# Patient Record
Sex: MALE | Race: WHITE | HISPANIC OR LATINO | Employment: OTHER | URBAN - METROPOLITAN AREA
[De-identification: names, ages, dates, MRNs, and addresses within clinical notes are randomized per-mention and may not be internally consistent; named-entity substitution may affect disease eponyms.]

---

## 2017-06-23 DIAGNOSIS — M16.11 UNILATERAL PRIMARY OSTEOARTHRITIS, RIGHT HIP: Primary | ICD-10-CM

## 2017-07-13 ENCOUNTER — CLINICAL SUPPORT (OUTPATIENT)
Dept: REHABILITATION | Facility: HOSPITAL | Age: 67
End: 2017-07-13
Attending: ORTHOPAEDIC SURGERY
Payer: MEDICARE

## 2017-07-13 DIAGNOSIS — M25.551 PAIN OF RIGHT HIP JOINT: ICD-10-CM

## 2017-07-13 DIAGNOSIS — Z74.09 IMPAIRED MOBILITY: ICD-10-CM

## 2017-07-13 DIAGNOSIS — R53.1 DECREASED STRENGTH: ICD-10-CM

## 2017-07-13 DIAGNOSIS — M16.11 OSTEOARTHRITIS OF RIGHT HIP, UNSPECIFIED OSTEOARTHRITIS TYPE: Primary | ICD-10-CM

## 2017-07-13 PROBLEM — M25.659 DECREASED RANGE OF MOTION OF HIP: Status: ACTIVE | Noted: 2017-07-13

## 2017-07-13 PROCEDURE — 97110 THERAPEUTIC EXERCISES: CPT | Mod: PN

## 2017-07-13 PROCEDURE — 97162 PT EVAL MOD COMPLEX 30 MIN: CPT | Mod: PN

## 2017-07-13 PROCEDURE — G8979 MOBILITY GOAL STATUS: HCPCS | Mod: CJ,PN

## 2017-07-13 PROCEDURE — G8978 MOBILITY CURRENT STATUS: HCPCS | Mod: CK,PN

## 2017-07-13 NOTE — PLAN OF CARE
TIME RECORD    Date: 07/13/2017    Start Time:  1405  Stop Time:  1455    PROCEDURES:    TIMED  Procedure Min.   TE 12'             UNTIMED  Procedure Min.   IE 38'         Total Timed Minutes:  12'  Total Timed Units:  1  Total Untimed Units:  1  Charges Billed/# of units:  2 (TE-1, MCE-1    OUTPATIENT PHYSICAL THERAPY   PATIENT EVALUATION  Onset Date: 5-6 months   Primary Diagnosis:   1. Osteoarthritis of right hip, unspecified osteoarthritis type     2. Pain of right hip joint     3. Decreased strength     4. Impaired mobility       Treatment Diagnosis: Hip pain, decreased LE flexibility, decreased hip strength  Past Medical History:   Diagnosis Date    Depression     Thyroid disease      Precautions: no running  Prior Therapy: none   Medications: Yomirufina Christy has a current medication list which includes the following prescription(s): alprazolam, clopidogrel, diazepam, levothyroxine, and pravastatin.  Nutrition: Obese (indicated by FOTO)  History of Present Illness: Chronic R hip pain  Prior Level of Function: Independent  Social History: Retired   Place of Residence (Steps/Adaptations): no barriers  Functional Deficits Leading to Referral/Nature of Injury: history of R hip pain  Patient Therapy Goals: Get rid of limp, relieve pain, get back to running    Subjective     Yomijoelle Christy reports pain in R hip beginning ~6 months ago. Used to run 5 miles a day, but had to stop due to pain ~5 months ago. Pt now walks 45 min a day, which intermittently causes pain. Pt also reports pain at times when he's sitting and lying down. Overall, pain occurs in WB. Intermittently has pain in R knee and ow back pain which pt refers to referred pain from the hip and posture respectively. Pt had X-Ray of R hip taken and reports results indicate decreased cartilage.    Pain:  Location: R hip  Description: Aching, Dull and Sharp  Activities Which Increase Pain: Standing and Walking  Activities Which Decrease Pain: pain  medication (ibproufen)  Pain Scale: 0/10 at best 0/10 now  5/10 at worst    Objective     Posture: Sitting: Rounded shoulders, forward head  Standing: L shoulder elevated (pt reports being L handed)  Palpation: slight TTP to R PSIS and tissue bordering sacrum on R  Pelvis aligned anteriorly and posteriorly  Decreased patellar mobility R and L, no pain  Range of Motion/Strength:     Lumbar  AROM: Pain/Dysfunction with Movement:   Flexion 65 No pain   Extension 25 No pain   Right side bending 15 No pain   Left side bending 13 No pain   Right rotation 90% No pain   Left rotation 100% No pain     Hip  Right   Left  Pain/Dysfunction with Movement    AROM PROM MMT AROM PROM MMT    Flexion NT NT 4+/5 NT NT 4+/5 No pain   Extension NT NT 4+/5 NT NT 4/5 No pain   Abduction NT NT 5/5 NT NT 5/5 No pain      Knee  Right   Left  Pain/Dysfunction with Movement    AROM PROM MMT AROM PROM MMT    Flexion NT NT 5/5 NT NT 5/5 No pain   Extension NT NT 5/5 NT NT 5/5 No pain     DF MMT: 4+/5 R, 5/5 L; no pain    Flexibility: Limited IR B  -35 HS length L 25 R     Gait: Decreased stance time on R (limp observed)    Special Tests: FADER (+) on R, (-) on L  FADIR (-) B   Scour (+) on R, (-) on L  Holt's sign (+) on R for interference of quad contraction (-) for pain, NT on L    Functional Limitation Reports: G codes  Tool: FOTO Hip SURVEY  Category: Mobility ()  Limitation: 42%  Current: CK = at least 40% but < 60% impaired, limited or restricted  Goal: CJ = at least 20% but < 40% impaired, limited or restricted    Treatment: Treatment to consist of manual therapy including STM/MFR right hip, PROM, stretching; therapeutic exercise including LE strengthening/strethcing, postural education, balance and gait training and modalities prn. Gave pt HEP consisting of SLR, clamshells, and HS/piriformis/quad/ITB stretches. Pt verbalized/demo understanding by performing exercises x 12'. Discussed POC with pt and pt verbalized  agreement.    Assessment       Initial Assessment (Pertinent finding, problem list and factors affecting outcome): Pt is a 67 yo female presenting to PT with pain, (+) hip pathology special tests, decreased lumbar ROM, decreased LE flexibility and strength, poor posture, impaired and gait, and functional deficits with walking and running. Impairments consistent with hip joint dysfunction. Pt would benefit from skilled PT to decrease pain and promote return to PLOF.     History  Co-morbidities and personal factors that may impact the plan of care Examination  Body Structures and Functions, activity limitations and participation restrictions that may impact the plan of care Clinical Presentation   Decision Making/ Complexity Score   Co-morbidities:   Arthritis  Obesity (indicated by FOTO)              Personal Factors:     moderate Body Regions: back, LE    Body Systems: musculoskeletal - posture, ROM, strength; neuromuscular - , balance, gait    Activity limitations: WB activity    Participation Restrictions: Running    high     FOTO Hip Survey: 42% limitation    moderate   moderate       Rehab Potiential: good  Barriers to Rehab: none  Short Term Goals (4 Weeks): 8/10/17  1. Pt will be compliant with HEP to supplement PT treatment in improving pain free mobility.  2. Pt will improve B HS length to -20 deg knee extension to improve walking mechanics.  3. Pt will improve R hip extension MMT to 4+/5 to increase hip support during gait performance.  4. Pt will ambulate without limp in to demonstrate improved pain with walking.  Long Term Goals (8 Weeks): 9/7/17  1. Pt will improve FOTO Hip score to </=33% to decrease perceived limitation with functional mobility  2. Pt will improve B HS length to </=15 deg knee extension to improve walking mechanics.  3. Pt will report pain </=2/10 at worst with walking to improve functional QOL.  4. Pt will improve lumbar side flexion to 25 deg B to decrease mechanical stresses placed  on hip with mobility.    Plan     Certification Period: 7/13/17 to 9/7/17  Recommended Treatment Plan: 2 times per week for 8 weeks (per request of pt due to co-pays): Gait Training, Manual Therapy, Moist Heat/ Ice, Neuromuscular Re-ed, Patient Education, Therapeutic Activites and Therapeutic Exercise  Other Recommendations: modalities prn, ASTYM prn, kinesiotape prn, Functional Dry Needling prn       Therapist: Constance Al, PT    I CERTIFY THE NEED FOR THESE SERVICES FURNISHED UNDER THIS PLAN OF TREATMENT AND WHILE UNDER MY CARE    Physician's comments: ________________________________________________________________________________________________________________________________________________      Physician's Name: ___________________________________

## 2017-07-20 ENCOUNTER — CLINICAL SUPPORT (OUTPATIENT)
Dept: REHABILITATION | Facility: HOSPITAL | Age: 67
End: 2017-07-20
Attending: ORTHOPAEDIC SURGERY
Payer: MEDICARE

## 2017-07-20 DIAGNOSIS — M25.551 PAIN OF RIGHT HIP JOINT: ICD-10-CM

## 2017-07-20 DIAGNOSIS — Z74.09 IMPAIRED MOBILITY: ICD-10-CM

## 2017-07-20 DIAGNOSIS — M16.11 OSTEOARTHRITIS OF RIGHT HIP, UNSPECIFIED OSTEOARTHRITIS TYPE: ICD-10-CM

## 2017-07-20 DIAGNOSIS — R53.1 DECREASED STRENGTH: ICD-10-CM

## 2017-07-20 DIAGNOSIS — M25.659 DECREASED RANGE OF MOTION OF HIP: ICD-10-CM

## 2017-07-20 PROCEDURE — 97110 THERAPEUTIC EXERCISES: CPT | Mod: PN

## 2017-07-20 NOTE — PROGRESS NOTES
"TIME RECORD    Date:  07/20/2017    Start Time:  200  Stop Time:  255    PROCEDURES:    TIMED  Procedure Min.   Therex 45     UNTIMED  Procedure Min.   Bike 5         Total Timed Minutes:  45  Total Timed Units:  3  Total Untimed Units:  0  Charges Billed/# of units:  3 TE      Progress/Current Status    Subjective:     Patient ID: Patrick Christy is a 66 y.o. male.  Diagnosis:   1. Pain of right hip joint     2. Decreased range of motion of hip     3. Decreased strength     4. Impaired mobility     5. Osteoarthritis of right hip, unspecified osteoarthritis type       Pain: 3 /10  Patient states "not much pain right now - but if I'm on it too long it can be rough."    Objective:     Patient initiated session on Sci Fit bike x 5 minutes.  Bike initially set at L1, patient increased to L5.  Instructed in and performed all therex as per log.  Reviewed all self stretching as performed for best technique.      Date  7/20/17   VISIT 2   Gcode 2/10   FOTO 2/5   Cap Visit   Cap Total  95.94  203.52   MT --   Long Sit HS stairs   .    Bike Sci Fit  L5 x 5 min   SLR 2x10 B   SLR 2x10 B   SL Abd Clams 2x12 B   Clams 2x12 B   Socorro Hip Add 5"x10   Bridges 2x10   LAQs --   Seated Hip Flex --   Cybex   Leg Press --   TKE --   Hip Abd --   Hip Flex --   Hip Ext --   HS Curls --   Knee Ext --   Step Ups --   Step Downs --   Gait --   CP --   Initials DH 1/6         Assessment:     Patient required re-instruction of self stretching for best/correct technique.  Able to perform same and all therex as logged without complaint of pain or difficulty.  "Actually its better now" after session.  Not specific to scale.    Patient Education/Response:     Reinstructed in self stretching for best technique.    Plans and Goals:     Continue PT per POC, progress as able.    Short Term Goals (4 Weeks): 8/10/17  1. Pt will be compliant with HEP to supplement PT treatment in improving pain free mobility.  2. Pt will improve B HS length to -20 deg knee " extension to improve walking mechanics.  3. Pt will improve R hip extension MMT to 4+/5 to increase hip support during gait performance.  4. Pt will ambulate without limp in to demonstrate improved pain with walking.  Long Term Goals (8 Weeks): 9/7/17  1. Pt will improve FOTO Hip score to </=33% to decrease perceived limitation with functional mobility  2. Pt will improve B HS length to </=15 deg knee extension to improve walking mechanics.  3. Pt will report pain </=2/10 at worst with walking to improve functional QOL.  4. Pt will improve lumbar side flexion to 25 deg B to decrease mechanical stresses placed on hip with mobility

## 2017-07-26 ENCOUNTER — CLINICAL SUPPORT (OUTPATIENT)
Dept: REHABILITATION | Facility: HOSPITAL | Age: 67
End: 2017-07-26
Attending: ORTHOPAEDIC SURGERY
Payer: MEDICARE

## 2017-07-26 DIAGNOSIS — M16.11 OSTEOARTHRITIS OF RIGHT HIP, UNSPECIFIED OSTEOARTHRITIS TYPE: ICD-10-CM

## 2017-07-26 DIAGNOSIS — M25.551 PAIN OF RIGHT HIP JOINT: ICD-10-CM

## 2017-07-26 DIAGNOSIS — M25.659 DECREASED RANGE OF MOTION OF HIP: ICD-10-CM

## 2017-07-26 DIAGNOSIS — R53.1 DECREASED STRENGTH: ICD-10-CM

## 2017-07-26 DIAGNOSIS — Z74.09 IMPAIRED MOBILITY: ICD-10-CM

## 2017-07-26 PROCEDURE — 97140 MANUAL THERAPY 1/> REGIONS: CPT | Mod: PN

## 2017-07-26 PROCEDURE — 97110 THERAPEUTIC EXERCISES: CPT | Mod: PN

## 2017-07-26 NOTE — PROGRESS NOTES
"TIME RECORD    Date:  07/26/2017    Start Time:  200  Stop Time:  255    PROCEDURES:    TIMED  Procedure Min.   Therex 45     UNTIMED  Procedure Min.   Bike 5         Total Timed Minutes:  45  Total Timed Units:  3  Total Untimed Units:  0  Charges Billed/# of units:  3 TE      Progress/Current Status    Subjective:     Patient ID: Patrick Christy is a 66 y.o. male.  Diagnosis:   1. Pain of right hip joint     2. Decreased range of motion of hip     3. Decreased strength     4. Impaired mobility     5. Osteoarthritis of right hip, unspecified osteoarthritis type       Pain: 3 /10  Patient states "not much pain right now - but if I'm on it too long it can be rough."    Objective:     Patient initiated session on Sci Fit bike x 5 minutes.  Bike initially set at L1, patient increased to L5.  Instructed in and performed all therex as per log.  Reviewed all self stretching as performed for best technique.      Date  7/20/17   VISIT 2   Gcode 2/10   FOTO 2/5   Cap Visit   Cap Total  95.94  203.52   MT --   Long Sit HS stairs   .    Bike Sci Fit  L5 x 5 min   SLR 2x10 B   SLR 2x10 B   SL Abd Clams 2x12 B   Clams 2x12 B   Socorro Hip Add 5"x10   Bridges 2x10   LAQs --   Seated Hip Flex --   Cybex   Leg Press --   TKE --   Hip Abd --   Hip Flex --   Hip Ext --   HS Curls --   Knee Ext --   Step Ups --   Step Downs --   Gait --   CP --   Initials DH 1/6         Assessment:     Patient required re-instruction of self stretching for best/correct technique.  Able to perform same and all therex as logged without complaint of pain or difficulty.  "Actually its better now" after session.  Not specific to scale.    Patient Education/Response:     Reinstructed in self stretching for best technique.    Plans and Goals:     Continue PT per POC, progress as able.    Short Term Goals (4 Weeks): 8/10/17  1. Pt will be compliant with HEP to supplement PT treatment in improving pain free mobility.  2. Pt will improve B HS length to -20 deg knee " extension to improve walking mechanics.  3. Pt will improve R hip extension MMT to 4+/5 to increase hip support during gait performance.  4. Pt will ambulate without limp in to demonstrate improved pain with walking.  Long Term Goals (8 Weeks): 9/7/17  1. Pt will improve FOTO Hip score to </=33% to decrease perceived limitation with functional mobility  2. Pt will improve B HS length to </=15 deg knee extension to improve walking mechanics.  3. Pt will report pain </=2/10 at worst with walking to improve functional QOL.  4. Pt will improve lumbar side flexion to 25 deg B to decrease mechanical stresses placed on hip with mobility

## 2017-07-26 NOTE — PROGRESS NOTES
"TIME RECORD    Date:  07/26/2017    Start Time:  1305  Stop Time:  1358    PROCEDURES:    TIMED  Procedure Min.   MT 15'   TE 38'     UNTIMED  Procedure Min.             Total Timed Minutes: 53'  Total Timed Units:  4  Total Untimed Units:  0  Charges Billed/# of units:  4 (TE-3, MT-1)      Progress/Current Status    Subjective:     Patient ID: Patrick Christy is a 66 y.o. male.  Diagnosis:   1. Pain of right hip joint     2. Decreased range of motion of hip     3. Decreased strength     4. Impaired mobility     5. Osteoarthritis of right hip, unspecified osteoarthritis type       Pain: 4 /10  Pt reports pain is about the same since starting therapy. Still able to walk and do mod exercise, but it continues to cause pain.     Objective:     MT x 15' including STM/MFR  to ITB, VL, piriformis/glutes; long axis sustained and oscillatory traction. TE 1:1 with PT per log x 38'. Added SL abduction and piriformis/ ITB/hip flexor stretches to improve hip flexibility and strength.    Date  7/26/17 7/20/17   VISIT 3 2   Gcode 3/10 2/10   FOTO 3/5 2/5   Cap Visit   Cap Total 125.80  329.32  95.94  203.52   MT 15' --   Stretches Supine:  Piriformis  HS c/ strap  ITB c/ strap  Hip flexor  3x30" all R HS-stairs  3x30"   Bike -- Sci Fit  L5 x 5 min   SLR 2x12 B 2x10 B   SL Abd 2x10 B Clams 2x12 B   Clams 2x10 B  2x12 B   Socorro Hip Add 5"x15 5"x10   Bridges 3"x2x12 2x10   LAQs  --   Seated Hip Flex  --   Cybex   Leg Press  --   TKE  --   Hip Abd  --   Hip Flex  --   Hip Ext  --   HS Curls  --   Knee Ext  --   Step Ups  --   Step Downs  --   Wall squats Mini squats against theraball 2x10    Gait  --   CP  --   Initials CC  1/6       Assessment:     No  Rhip pain at end of session. Pt tolerated new stretches and progression of exercises and addition of SL well, using proper form with verbal cues.    Patient Education/Response:     PT suggested buying tennis ball to put under glutes/piriformis to relieve pain. Perform stretches on both " LE for HEP.    Plans and Goals:     Continue PT per POC, progress as able.    Short Term Goals (4 Weeks): 8/10/17  1. Pt will be compliant with HEP to supplement PT treatment in improving pain free mobility.  2. Pt will improve B HS length to -20 deg knee extension to improve walking mechanics.  3. Pt will improve R hip extension MMT to 4+/5 to increase hip support during gait performance.  4. Pt will ambulate without limp in to demonstrate improved pain with walking.  Long Term Goals (8 Weeks): 9/7/17  1. Pt will improve FOTO Hip score to </=33% to decrease perceived limitation with functional mobility  2. Pt will improve B HS length to </=15 deg knee extension to improve walking mechanics.  3. Pt will report pain </=2/10 at worst with walking to improve functional QOL.  4. Pt will improve lumbar side flexion to 25 deg B to decrease mechanical stresses placed on hip with mobility

## 2017-08-01 ENCOUNTER — CLINICAL SUPPORT (OUTPATIENT)
Dept: REHABILITATION | Facility: HOSPITAL | Age: 67
End: 2017-08-01
Attending: ORTHOPAEDIC SURGERY
Payer: MEDICARE

## 2017-08-01 DIAGNOSIS — R53.1 DECREASED STRENGTH: ICD-10-CM

## 2017-08-01 DIAGNOSIS — M25.551 PAIN OF RIGHT HIP JOINT: ICD-10-CM

## 2017-08-01 DIAGNOSIS — Z74.09 IMPAIRED MOBILITY: ICD-10-CM

## 2017-08-01 DIAGNOSIS — M16.11 OSTEOARTHRITIS OF RIGHT HIP, UNSPECIFIED OSTEOARTHRITIS TYPE: ICD-10-CM

## 2017-08-01 DIAGNOSIS — M25.659 DECREASED RANGE OF MOTION OF HIP: ICD-10-CM

## 2017-08-01 PROCEDURE — 97110 THERAPEUTIC EXERCISES: CPT | Mod: PN

## 2017-08-01 PROCEDURE — 97140 MANUAL THERAPY 1/> REGIONS: CPT | Mod: PN

## 2017-08-01 NOTE — PROGRESS NOTES
"TIME RECORD    Date:  08/01/2017    Start Time:  4:00  Stop Time:   4:55    PROCEDURES:    TIMED  Procedure Min.   MT 15'   TE supervised  20'   TE 20'     UNTIMED  Procedure Min.             Total Timed Minutes: 55'  Total Timed Units:  2  Total Untimed Units:  0  Charges Billed/# of units:  2 (TE-1, MT-1)      Progress/Current Status    Subjective:     Patient ID: Patrick Christy is a 67 y.o. male.  Diagnosis:   1. Pain of right hip joint     2. Decreased range of motion of hip     3. Decreased strength     4. Impaired mobility     5. Osteoarthritis of right hip, unspecified osteoarthritis type       Pain: 2 /10; 0/10 following interventions  Pt reports incidence, intensity and duration of pain diminished.    Objective:   Treatment initiated with recumbent bike 10' followed bt supervised TE 15' then 1:1 with 20 minutes  With  PTA TE per log.  MT x 15' including STM/MFR  to ITB, VL, piriformis/glutes; long axis sustained and oscillatory traction.  flexor stretches to improve hip flexibility and strength.    Date  8/01/17 7/26/17 7/20/17   VISIT  3 2   Gcode 4/10 3/10 2/10   FOTO 4/5 3/5 2/5   Cap Visit   Cap Total 61.84  391. 16 125.80  329.32  95.94  203.52   MT 15' 15' --   Stretches Supine  Piriformis  HSS w/strap  Hip flex  3 x 30" all Supine:  Piriformis  HS c/ strap  ITB c/ strap  Hip flexor  3x30" all R HS-stairs  3x30"   Bike 5' -- Sci Fit  L5 x 5 min   SLR 2 x 12 B 2x12 B 2x10 B   SL Abd 2 x 10 B 2x10 B Clams 2x12 B   Clams 2 x 10 B GTB 2x10 B  2x12 B   Socorro Hip Add 5" x 15 5"x15 5"x10   Bridges 3" x 20 3"x2x12 2x10   LAQs   --   Seated Hip Flex   --   Cybex   Leg Press   --   TKE   --   Hip Abd   --   Hip Flex   --   Hip Ext   --   HS Curls   --   Knee Ext   --   Step Ups   --   Step Downs   --   Wall squats oot Mini squats against theraball 2x10    Gait   --   CP   --   Initials MB 1/6 CC DH 1/6       Assessment:     Initial Right hip pain abolished following interventions.     Patient Education/Response: "     PT suggested buying tennis ball to put under glutes/piriformis to relieve pain. Perform stretches on both LE for HEP.    Plans and Goals:     Continue PT per POC, progress as able.    Short Term Goals (4 Weeks): 8/10/17  1. Pt will be compliant with HEP to supplement PT treatment in improving pain free mobility.  2. Pt will improve B HS length to -20 deg knee extension to improve walking mechanics.  3. Pt will improve R hip extension MMT to 4+/5 to increase hip support during gait performance.  4. Pt will ambulate without limp in to demonstrate improved pain with walking.  Long Term Goals (8 Weeks): 9/7/17  1. Pt will improve FOTO Hip score to </=33% to decrease perceived limitation with functional mobility  2. Pt will improve B HS length to </=15 deg knee extension to improve walking mechanics.  3. Pt will report pain </=2/10 at worst with walking to improve functional QOL.  4. Pt will improve lumbar side flexion to 25 deg B to decrease mechanical stresses placed on hip with mobility

## 2017-08-03 ENCOUNTER — CLINICAL SUPPORT (OUTPATIENT)
Dept: REHABILITATION | Facility: HOSPITAL | Age: 67
End: 2017-08-03
Attending: ORTHOPAEDIC SURGERY
Payer: MEDICARE

## 2017-08-03 DIAGNOSIS — M16.11 OSTEOARTHRITIS OF RIGHT HIP, UNSPECIFIED OSTEOARTHRITIS TYPE: ICD-10-CM

## 2017-08-03 DIAGNOSIS — R53.1 DECREASED STRENGTH: ICD-10-CM

## 2017-08-03 DIAGNOSIS — M25.659 DECREASED RANGE OF MOTION OF HIP: ICD-10-CM

## 2017-08-03 DIAGNOSIS — M25.551 PAIN OF RIGHT HIP JOINT: ICD-10-CM

## 2017-08-03 DIAGNOSIS — Z74.09 IMPAIRED MOBILITY: ICD-10-CM

## 2017-08-03 PROCEDURE — 97110 THERAPEUTIC EXERCISES: CPT | Mod: PN

## 2017-08-03 NOTE — PROGRESS NOTES
"TIME RECORD    Date:  08/03/2017    Start Time:  2:00  Stop Time:   2:55       PROCEDURES:    TIMED  Procedure Min.   MT NP   TE supervised NP   TE 45     UNTIMED  Procedure Min.   Bike  10'         Total Timed Minutes: 55'  Total Timed Units:  2  Total Untimed Units:  0  Charges Billed/# of units:  3 (TE-3,  )      Progress/Current Status    Subjective:     Patient ID: Patrick Christy is a 67 y.o. male.  Diagnosis:   No diagnosis found.  Pain: 1 /10; 0/10 following interventions  Pt reports incidence, intensity and duration of pain diminished.    Objective:   Treatment initiated with recumbent bike 10' followed bt supervised TE 15' then 1:1 with 20 minutes  With  PTA TE per log.  MT x 5' including Manual IT maurisio stretch w/hip anchor (Not performed due to pain free STM/MFR  to ITB, VL, piriformis/glutes; long axis sustained and oscillatory traction.)  flexor stretches to improve hip flexibility and strength.    Date  08/03/17 8/01/17 7/26/17 7/20/17   VISIT   3 2   Gcode 5/10 4/10 3/10 2/10   FOTO DONE 4/5 3/5 2/5   Cap Visit   Cap Total  61.84  391. 16 125.80  329.32  95.94  203.52   MT  15' 15' --   Stretches Supine  Piriformis  HS  Stretches strap.Thomasposition quad  3x30" B all Supine  Piriformis  HSS w/strap  Hip flex  3 x 30" all Supine:  Piriformis  HS c/ strap  ITB c/ strap  Hip flexor  3x30" all R HS-stairs  3x30"   Bike 10' 5' -- Sci Fit  L5 x 5 min   SLR 2x 12 B 2 x 12 B 2x12 B 2x10 B   SL Abd 2 x 12 B 2 x 10 B 2x10 B Clams 2x12 B   Clams supine 2 x 15 GTB 2 x 10 B GTB 2x10 B  2x12 B   Socorro Hip Add 5" x15 w/ball 5" x 15 5"x15 5"x10   Bridges 3" x 20 3" x 20 3"x2x12 2x10   LAQs    --   Seated Hip Flex    --   Cybex   Leg Press    --   TKE    --   Hip Abd    --   Hip Flex    --   Hip Ext    --   HS Curls    --   Knee Ext    --   Step Ups    --   Step Downs    --   Wall squats  oot Mini squats against theraball 2x10    Gait    --   CP    --   Initials MB 2/6 MB 1/6 CC DH 1/6       Assessment:     Presents " with Initial Right hip pain One point  Less than last. Good HEP compliance.    Patient Education/Response:     PT suggested buying tennis ball to put under glutes/piriformis to relieve pain. Perform stretches on both LE for HEP.    Plans and Goals:     Continue PT per POC, progress as able.    Short Term Goals (4 Weeks): 8/10/17  1. Pt will be compliant with HEP to supplement PT treatment in improving pain free mobility.  2. Pt will improve B HS length to -20 deg knee extension to improve walking mechanics.  3. Pt will improve R hip extension MMT to 4+/5 to increase hip support during gait performance.  4. Pt will ambulate without limp in to demonstrate improved pain with walking.  Long Term Goals (8 Weeks): 9/7/17  1. Pt will improve FOTO Hip score to </=33% to decrease perceived limitation with functional mobility  2. Pt will improve B HS length to </=15 deg knee extension to improve walking mechanics.  3. Pt will report pain </=2/10 at worst with walking to improve functional QOL.  4. Pt will improve lumbar side flexion to 25 deg B to decrease mechanical stresses placed on hip with mobility

## 2017-08-08 ENCOUNTER — CLINICAL SUPPORT (OUTPATIENT)
Dept: REHABILITATION | Facility: HOSPITAL | Age: 67
End: 2017-08-08
Attending: ORTHOPAEDIC SURGERY
Payer: MEDICARE

## 2017-08-08 DIAGNOSIS — M25.659 DECREASED RANGE OF MOTION OF HIP: ICD-10-CM

## 2017-08-08 DIAGNOSIS — M25.551 PAIN OF RIGHT HIP JOINT: ICD-10-CM

## 2017-08-08 DIAGNOSIS — M16.11 OSTEOARTHRITIS OF RIGHT HIP, UNSPECIFIED OSTEOARTHRITIS TYPE: ICD-10-CM

## 2017-08-08 DIAGNOSIS — Z74.09 IMPAIRED MOBILITY: ICD-10-CM

## 2017-08-08 DIAGNOSIS — R53.1 DECREASED STRENGTH: ICD-10-CM

## 2017-08-08 PROCEDURE — 97110 THERAPEUTIC EXERCISES: CPT | Mod: PN

## 2017-08-08 NOTE — PROGRESS NOTES
"TIME RECORD    Date:  08/08/2017    Start Time:  1:55  Stop Time:   2:55       PROCEDURES:    TIMED  Procedure Min.   MT NP   TE supervised NP   TE 50     UNTIMED  Procedure Min.   Bike  10'         Total Timed Minutes: 60'  Total Timed Units:  4  Total Untimed Units:  0  Charges Billed/# of units:  4 (TE-4)      Progress/Current Status    Subjective:     Patient ID: Patrick Christy is a 67 y.o. male.  Diagnosis:   1. Pain of right hip joint     2. Decreased range of motion of hip     3. Decreased strength     4. Impaired mobility     5. Osteoarthritis of right hip, unspecified osteoarthritis type       Pain: 0 /10; 0/10 following interventions  Pt reports incidence, intensity and duration of pain diminished. "I'm a little better than last week." Patient visited a Naturopathic medical practitioner who prescribed natural remedies including Glucosamine     Objective:   Treatment initiated with Sci fit B U&LE reciprocal integration L 5 for  10'  > 60 spm w/o rest followed by 1:1 with 45 minutes  With  PTA TE per log.      Date  8/8/17 08/03/17 8/01/17 7/26/17 7/20/17   VISIT    3 2   Gcode 6/10 5/10 4/10 3/10 2/10   FOTO  DONE 4/5 3/5 2/5   Cap Visit   Cap Total 127.92  615.02 95.94  487.10 61.84  391. 16 125.80  329.32  95.94  203.52   MT -- -- 15' 15' --   Stretches Supine  Piriformis  HS  Sudarshan quad  3 x 30" all Supine  Piriformis  HS  Stretches strap.Thomasposition quad  3x30" B all Supine  Piriformis  HSS w/strap  Hip flex  3 x 30" all Supine:  Piriformis  HS c/ strap  ITB c/ strap  Hip flexor  3x30" all R HS-stairs  3x30"   Bike 10' L5 10' 5' -- Sci Fit  L5 x 5 min   SLR  2x 12 B 2 x 12 B 2x12 B 2x10 B   SL Abd  2 x 12 B 2 x 10 B 2x10 B Clams 2x12 B   Clams supine 2x15 GTB 2 x 15 GTB 2 x 10 B GTB 2x10 B  2x12 B   Socorro Hip Add 5" x 15 w/ball 5" x15 w/ball 5" x 15 5"x15 5"x10   Bridges  3" x 20 3" x 20 3"x2x12 2x10   LAQs     --   Seated Hip Flex     --   Cybex   Leg Press 2 x 15 65#    --   TKE     --   Hip Abd 2 " x 10 20#    --   Hip Flex     --   Hip Ext 2 x 10 20#    --   HS Curls     --   Knee Ext     --   Step Ups 2 x 10    --   Step Downs 2 x 10    --   Step bkwd.fwd  BTB resistance 2 x 10       Side step with BTB resistance  3 laps       Wall squats   oot Mini squats against theraball 2x10    Gait     --   CP     --   Initials MB 3/6 MB 2/6 MB 1/6 CC DH 1/6       Assessment:     Presents pain free. Tolerated progression of treatment without pain.  Good HEP compliance.    Patient Education/Response:     PT suggested buying tennis ball to put under glutes/piriformis to relieve pain. Perform stretches on both LE for HEP.    Plans and Goals:     Continue PT per POC, progress as able.    Short Term Goals (4 Weeks): 8/10/17  1. Pt will be compliant with HEP to supplement PT treatment in improving pain free mobility.  2. Pt will improve B HS length to -20 deg knee extension to improve walking mechanics.  3. Pt will improve R hip extension MMT to 4+/5 to increase hip support during gait performance.  4. Pt will ambulate without limp in to demonstrate improved pain with walking.  Long Term Goals (8 Weeks): 9/7/17  1. Pt will improve FOTO Hip score to </=33% to decrease perceived limitation with functional mobility  2. Pt will improve B HS length to </=15 deg knee extension to improve walking mechanics.  3. Pt will report pain </=2/10 at worst with walking to improve functional QOL.  4. Pt will improve lumbar side flexion to 25 deg B to decrease mechanical stresses placed on hip with mobility

## 2017-08-10 ENCOUNTER — PATIENT MESSAGE (OUTPATIENT)
Dept: REHABILITATION | Facility: HOSPITAL | Age: 67
End: 2017-08-10

## 2017-08-15 ENCOUNTER — CLINICAL SUPPORT (OUTPATIENT)
Dept: REHABILITATION | Facility: HOSPITAL | Age: 67
End: 2017-08-15
Attending: ORTHOPAEDIC SURGERY
Payer: MEDICARE

## 2017-08-15 DIAGNOSIS — R53.1 DECREASED STRENGTH: ICD-10-CM

## 2017-08-15 DIAGNOSIS — M25.659 DECREASED RANGE OF MOTION OF HIP: ICD-10-CM

## 2017-08-15 DIAGNOSIS — M25.551 PAIN OF RIGHT HIP JOINT: ICD-10-CM

## 2017-08-15 DIAGNOSIS — Z74.09 IMPAIRED MOBILITY: ICD-10-CM

## 2017-08-15 DIAGNOSIS — M16.11 OSTEOARTHRITIS OF RIGHT HIP, UNSPECIFIED OSTEOARTHRITIS TYPE: ICD-10-CM

## 2017-08-15 PROCEDURE — 97140 MANUAL THERAPY 1/> REGIONS: CPT | Mod: PN

## 2017-08-15 PROCEDURE — 97110 THERAPEUTIC EXERCISES: CPT | Mod: PN

## 2017-08-15 NOTE — PROGRESS NOTES
"TIME RECORD    Date:  08/15/2017    Start Time:  1505  Stop Time:   1600      PROCEDURES:    TIMED  Procedure Min.   MT 17'   TE 38'         UNTIMED  Procedure Min.             Total Timed Minutes: 55'  Total Timed Units:  4  Total Untimed Units:  0  Charges Billed/# of units:  4 (MT-1, TE-3)      Progress/Current Status    Subjective:     Patient ID: Patrick Christy is a 67 y.o. male.  Diagnosis:   1. Pain of right hip joint     2. Decreased range of motion of hip     3. Decreased strength     4. Impaired mobility     5. Osteoarthritis of right hip, unspecified osteoarthritis type       Pain: 5/10 in R hip pre PT, 1/10 post PT    Pt reports increased R pain since last Thursday. Pt reports he feels pain every where, even upper back. Pain in upper back began Tuesday night and pt attributes upper back pain to stretches with strap. Pt reports he's been doing HEP list 3x15, reporting HEP takes over an hour to do HEP each time. Pt reports his wife tells him he has a tendency of overdoing things. Pt states addition of lifting weighted ball and performing circles with B LE on ball last visit, but exercises not in log.     Objective:     MT x 17' including STM/MFR to R ITB, VL, HS, and piriformis. TE 1:1 with PT x 38' per log. Modified HS stretch and quad stretch to standing. HELD SL abduction.    Date  8/15/17 8/8/17 08/03/17 8/01/17 7/26/17 7/20/17   VISIT 7 6 5 4 3 2   Gcode 7/10 6/10 5/10 4/10 3/10 2/10   FOTO   DONE 4/5 3/5 2/5   Cap Visit   Cap Total 125.80  740.82 127.92  615.02 95.94  487.10 61.84  391. 16 125.80  329.32  95.94  203.52   MT 17' -- -- 15' 15' --   Stretches Standing  HS 2x1'  Quad 2x 1'  Supine  Piriformis 3x30" Supine  Piriformis  HS  Sudarshan quad  3 x 30" all Supine  Piriformis  HS  Stretches strap.Thomasposition quad  3x30" B all Supine  Piriformis  HSS w/strap  Hip flex  3 x 30" all Supine:  Piriformis  HS c/ strap  ITB c/ strap  Hip flexor  3x30" all R HS-stairs  3x30"   Bike -- 10' L5 10' 5' -- " "Sci Fit  L5 x 5 min   SLR 2x10 B  2x 12 B 2 x 12 B 2x12 B 2x10 B   SL Abd HELD  2 x 12 B 2 x 10 B 2x10 B Clams 2x12 B   Clams supine 2x15 GTB  ^ next 2x15 GTB 2 x 15 GTB 2 x 10 B GTB 2x10 B  2x12 B   Socorro Hip Add 5" x 15 w/ball 5" x 15 w/ball 5" x15 w/ball 5" x 15 5"x15 5"x10   Bridges   3" x 20 3" x 20 3"x2x12 2x10   LAQs      --   Seated Hip Flex      --   Cybex   Leg Press 6.5 pl  2x15 DL 2 x 15 65#    --   TKE      --   Hip Abd 2 pl 2x10 B 2 x 10 20#    --   Hip Flex      --   Hip Ext 2 pl 2x10 B 2 x 10 20#    --   HS Curls      --   Knee Ext      --   Step Ups oot 2 x 10    --   Step Downs oot 2 x 10    --   Step bkwd.fwd  BTB resistance oot 2 x 10       Side step with BTB resistance  oot 3 laps       Wall squats    oot Mini squats against theraball 2x10    Gait      --   CP      --   Initials CC MB 3/6 MB 2/6 MB 1/6 CC DH 1/6       Assessment:     Improvement in pain post MT and TE. Held SL abduction per pt report that completion of them during HEP increases R hip pain. Held progression of TE due to increased pain initially.     Patient Education/Response:     Emailed pt updated HEP with standing quad and HS stretch, supine piriformis stretch, SLR, hip adduction isometric, and clamshells with GTB. Pt to perform stretches 3x30" and exercises no more than 2x15 reps. Pt verbalized understanding.    Plans and Goals:     Continue PT per POC, progress as able.    Short Term Goals (4 Weeks): 8/10/17  1. Pt will be compliant with HEP to supplement PT treatment in improving pain free mobility.  2. Pt will improve B HS length to -20 deg knee extension to improve walking mechanics.  3. Pt will improve R hip extension MMT to 4+/5 to increase hip support during gait performance.  4. Pt will ambulate without limp in to demonstrate improved pain with walking.  Long Term Goals (8 Weeks): 9/7/17  1. Pt will improve FOTO Hip score to </=33% to decrease perceived limitation with functional mobility  2. Pt will improve B HS " length to </=15 deg knee extension to improve walking mechanics.  3. Pt will report pain </=2/10 at worst with walking to improve functional QOL.  4. Pt will improve lumbar side flexion to 25 deg B to decrease mechanical stresses placed on hip with mobility

## 2017-08-16 ENCOUNTER — DOCUMENTATION ONLY (OUTPATIENT)
Dept: REHABILITATION | Facility: HOSPITAL | Age: 67
End: 2017-08-16

## 2017-08-16 NOTE — PROGRESS NOTES
Face to Face PTA Conference performed with  Filipe Louise PTA, Darin Yin PTA, Yamila Beauchamp, PTA and Barbara Eli PTA regarding patient's current status, overall progress, and plan of care    Face to face PT Conference performed with , Constance Al PT  regarding patient progress, current status, and plan of care. .     Filipe Louise, PTA    Face to face conference completed with Constance Al, PT on 8/15/17 regarding current status and overall progress of Yomi. Westley.  Barbara Eli PTA    Face to face conference completed with Constance Al PT on 8/15/17 regarding current status and overall progress of Yomi. Christy.  Neena Beauchamp, PTA      Face to face conference completed with Constance Al PT on 8/15/17 regarding current status and overall progress of Yomi. Christy.   Darin Yin, PTA

## 2017-08-17 ENCOUNTER — CLINICAL SUPPORT (OUTPATIENT)
Dept: REHABILITATION | Facility: HOSPITAL | Age: 67
End: 2017-08-17
Attending: ORTHOPAEDIC SURGERY
Payer: MEDICARE

## 2017-08-17 DIAGNOSIS — M25.551 PAIN OF RIGHT HIP JOINT: ICD-10-CM

## 2017-08-17 DIAGNOSIS — M25.659 DECREASED RANGE OF MOTION OF HIP: ICD-10-CM

## 2017-08-17 DIAGNOSIS — M16.11 OSTEOARTHRITIS OF RIGHT HIP, UNSPECIFIED OSTEOARTHRITIS TYPE: ICD-10-CM

## 2017-08-17 DIAGNOSIS — R53.1 DECREASED STRENGTH: ICD-10-CM

## 2017-08-17 DIAGNOSIS — Z74.09 IMPAIRED MOBILITY: ICD-10-CM

## 2017-08-17 PROCEDURE — 97110 THERAPEUTIC EXERCISES: CPT | Mod: PN

## 2017-08-17 PROCEDURE — 97140 MANUAL THERAPY 1/> REGIONS: CPT | Mod: PN

## 2017-08-17 NOTE — PROGRESS NOTES
"TIME RECORD    Date:  08/17/2017    Start Time:  1500  Stop Time:   1600      PROCEDURES:    TIMED  Procedure Min.   MT 20'   TE 40'         UNTIMED  Procedure Min.             Total Timed Minutes: 60'  Total Timed Units:  4  Total Untimed Units:  0  Charges Billed/# of units:  4 (MT-1, TE-3)      Progress/Current Status    Subjective:     Patient ID: Patrick Christy is a 67 y.o. male.  Diagnosis:   1. Pain of right hip joint     2. Decreased range of motion of hip     3. Decreased strength     4. Impaired mobility     5. Osteoarthritis of right hip, unspecified osteoarthritis type       Pain: 5/10 in R hip pre PT, 0/10 post PT    Pt reports  Pain abolished with joint mobs and stretches    Objective:     MT x 20' including AP, distraction, and transverse Right  Hip joint mobilizations Gr 2-3. TE 1:1 with PT x 40' per log. Modified HS stretch and quad stretch to standing.     Date  8/17/17 8/15/17 8/8/17 08/03/17 8/01/17 7/26/17 7/20/17   VISIT 8 7 6 5 4 3 2   Gcode 8/10 7/10 6/10 5/10 4/10 3/10 2/10   FOTO    DONE 4/5 3/5 2/5   Cap Visit   Cap Total 124.33 125.80  740.82 127.92  615.02 95.94  487.10 61.84  391. 16 125.80  329.32  95.94  203.52   MT 20' 17' -- -- 15' 15' --   Stretches Sup HSS 3 x30" B  Sudarshan quad  3 x30" B  Piriformis  3 x 30" B  Calf EOS 3 x 30" B Standing  HS 2x1'  Quad 2x 1'  Supine  Piriformis 3x30" Supine  Piriformis  HS  Sudarshan quad  3 x 30" all Supine  Piriformis  HS  Stretches strap.Thomasposition quad  3x30" B all Supine  Piriformis  HSS w/strap  Hip flex  3 x 30" all Supine:  Piriformis  HS c/ strap  ITB c/ strap  Hip flexor  3x30" all R HS-stairs  3x30"   Bike  -- 10' L5 10' 5' -- Sci Fit  L5 x 5 min   SLR  2x10 B  2x 12 B 2 x 12 B 2x12 B 2x10 B   SL Abd  HELD  2 x 12 B 2 x 10 B 2x10 B Clams 2x12 B   Clams supine  2x15 GTB  ^ next 2x15 GTB 2 x 15 GTB 2 x 10 B GTB 2x10 B  2x12 B   Socorro Hip Add  5" x 15 w/ball 5" x 15 w/ball 5" x15 w/ball 5" x 15 5"x15 5"x10   Bridges    3" x 20 3" x 20 " "3"x2x12 2x10   LAQs       --   Seated Hip Flex       --   Cybex   Leg Press 8.5 2 x 15 B 6.5 pl  2x15 DL 2 x 15 65#    --   TKE       --   Hip Abd 2 x 15 B 2.0 2 pl 2x10 B 2 x 10 20#    --   Hip Flex       --   Hip Ext 2 x 15 B 2.0 2 pl 2x10 B 2 x 10 20#    --   HS Curls       --   Knee Ext       --   Step Ups  oot 2 x 10    --   Step Downs  oot 2 x 10    --   Step bkwd.fwd  BTB resistance  oot 2 x 10       Side step with BTB resistance   oot 3 laps       Wall squats     oot Mini squats against theraball 2x10    Gait       --   CP       --   Initials  CC MB 3/6 MB 2/6 MB 1/6 CC DH 1/6       Assessment:     Tolerated treatment well with good pain relief following interventions.    Patient Education/Response:     Emailed pt updated HEP with standing quad and HS stretch, supine piriformis stretch, SLR, hip adduction isometric, and clamshells with GTB. Pt to perform stretches 3x30" and exercises no more than 2x15 reps. Pt verbalized understanding.    Plans and Goals:     Continue PT per POC, progress as able.    Short Term Goals (4 Weeks): 8/10/17  1. Pt will be compliant with HEP to supplement PT treatment in improving pain free mobility.  2. Pt will improve B HS length to -20 deg knee extension to improve walking mechanics.  3. Pt will improve R hip extension MMT to 4+/5 to increase hip support during gait performance.  4. Pt will ambulate without limp in to demonstrate improved pain with walking.  Long Term Goals (8 Weeks): 9/7/17  1. Pt will improve FOTO Hip score to </=33% to decrease perceived limitation with functional mobility  2. Pt will improve B HS length to </=15 deg knee extension to improve walking mechanics.  3. Pt will report pain </=2/10 at worst with walking to improve functional QOL.  4. Pt will improve lumbar side flexion to 25 deg B to decrease mechanical stresses placed on hip with mobility    "

## 2017-08-22 ENCOUNTER — CLINICAL SUPPORT (OUTPATIENT)
Dept: REHABILITATION | Facility: HOSPITAL | Age: 67
End: 2017-08-22
Attending: ORTHOPAEDIC SURGERY
Payer: MEDICARE

## 2017-08-22 DIAGNOSIS — M25.659 DECREASED RANGE OF MOTION OF HIP: ICD-10-CM

## 2017-08-22 DIAGNOSIS — M16.11 OSTEOARTHRITIS OF RIGHT HIP, UNSPECIFIED OSTEOARTHRITIS TYPE: ICD-10-CM

## 2017-08-22 DIAGNOSIS — Z74.09 IMPAIRED MOBILITY: ICD-10-CM

## 2017-08-22 DIAGNOSIS — R53.1 DECREASED STRENGTH: ICD-10-CM

## 2017-08-22 DIAGNOSIS — M25.551 PAIN OF RIGHT HIP JOINT: ICD-10-CM

## 2017-08-22 PROCEDURE — 97140 MANUAL THERAPY 1/> REGIONS: CPT | Mod: PN

## 2017-08-22 PROCEDURE — 97110 THERAPEUTIC EXERCISES: CPT | Mod: PN

## 2017-08-22 NOTE — PROGRESS NOTES
"TIME RECORD    Date:  08/22/2017    Start Time:  1605  Stop Time:  1655      PROCEDURES:    TIMED  Procedure Min.   MT 15   TE 10   TE Supervised 25 NC     UNTIMED  Procedure Min.             Total Timed Minutes: 25  Total Timed Units:  2  Total Untimed Units:  0  Charges Billed/# of units:  2 (MT-1, TE-1)      Progress/Current Status    Subjective:     Patient ID: Patrick Christy is a 67 y.o. male.  Diagnosis:   1. Pain of right hip joint     2. Decreased range of motion of hip     3. Decreased strength     4. Impaired mobility     5. Osteoarthritis of right hip, unspecified osteoarthritis type       Pain: 2/10 in R hip    Pt reports "some pain" today.    Objective:     Pt initiated treatment on NuStep x 8' for active warmup f/b supervised TE x 17' f/b MT x 15' including long-axis distraction and lateral hip mobs then prone for P-A mobs Grade IV f/b TE 1:1 with PT per log x 10'    Date  8/22/17 8/17/17 8/15/17 8/8/17 08/03/17 8/01/17 7/26/17 7/20/17   VISIT 9 8 7 6 5 4 3 2   Gcode 9/10 8/10 7/10 6/10 5/10 4/10 3/10 2/10   FOTO     DONE 4/5 3/5 2/5   Cap Visit   Cap Total 61.84  926.99 124.33 125.80  740.82 127.92  615.02 95.94  487.10 61.84  391. 16 125.80  329.32  95.94  203.52   MT 15' 20' 17' -- -- 15' 15' --   Stretches Sup HSS 3 x30" B  Sudarshan quad  3 x30" B  Piriformis  3 x 30" B   Sup HSS 3 x30" B  Sudarshan quad  3 x30" B  Piriformis  3 x 30" B  Calf EOS 3 x 30" B Standing  HS 2x1'  Quad 2x 1'  Supine  Piriformis 3x30" Supine  Piriformis  HS  Sudarshan quad  3 x 30" all Supine  Piriformis  HS  Stretches strap.Thomasposition quad  3x30" B all Supine  Piriformis  HSS w/strap  Hip flex  3 x 30" all Supine:  Piriformis  HS c/ strap  ITB c/ strap  Hip flexor  3x30" all R HS-stairs  3x30"   Bike   -- 10' L5 10' 5' -- Sci Fit  L5 x 5 min   SLR   2x10 B  2x 12 B 2 x 12 B 2x12 B 2x10 B   SL Abd   HELD  2 x 12 B 2 x 10 B 2x10 B Clams 2x12 B   Clams supine   2x15 GTB  ^ next 2x15 GTB 2 x 15 GTB 2 x 10 B GTB 2x10 B  2x12 B " "  Socorro Hip Add   5" x 15 w/ball 5" x 15 w/ball 5" x15 w/ball 5" x 15 5"x15 5"x10   Bridges     3" x 20 3" x 20 3"x2x12 2x10   LAQs        --   Seated Hip Flex        --   Cybex   Leg Press  8.5 2 x 15 B 6.5 pl  2x15 DL 2 x 15 65#    --   TKE        --   Hip Abd  2 x 15 B 2.0 2 pl 2x10 B 2 x 10 20#    --   Hip Flex        --   Hip Ext  2 x 15 B 2.0 2 pl 2x10 B 2 x 10 20#    --   HS Curls        --   Knee Ext        --   Step Ups   oot 2 x 10    --   Step Downs 2x10 on Bosu  oot 2 x 10    --   Step bkwd.fwd  BTB resistance 2 laps  oot 2 x 10       Side step with BTB resistance  2 laps  oot 3 laps       Wall squats 2x15 c PB     oot Mini squats against theraball 2x10    Gait        --   CP        --   Initials KV  CC MB 3/6 MB 2/6 MB 1/6 CC DH 1/6       Assessment:     Pt tolerated treatment well with reports of decreased pain to 1/10.     Patient Education/Response:     Cont HEP.    Plans and Goals:     Continue PT per POC, progress as able.    Short Term Goals (4 Weeks): 8/10/17  1. Pt will be compliant with HEP to supplement PT treatment in improving pain free mobility.  2. Pt will improve B HS length to -20 deg knee extension to improve walking mechanics.  3. Pt will improve R hip extension MMT to 4+/5 to increase hip support during gait performance.  4. Pt will ambulate without limp in to demonstrate improved pain with walking.  Long Term Goals (8 Weeks): 9/7/17  1. Pt will improve FOTO Hip score to </=33% to decrease perceived limitation with functional mobility  2. Pt will improve B HS length to </=15 deg knee extension to improve walking mechanics.  3. Pt will report pain </=2/10 at worst with walking to improve functional QOL.  4. Pt will improve lumbar side flexion to 25 deg B to decrease mechanical stresses placed on hip with mobility    "

## 2017-08-24 ENCOUNTER — CLINICAL SUPPORT (OUTPATIENT)
Dept: REHABILITATION | Facility: HOSPITAL | Age: 67
End: 2017-08-24
Attending: ORTHOPAEDIC SURGERY
Payer: MEDICARE

## 2017-08-24 DIAGNOSIS — M25.551 PAIN OF RIGHT HIP JOINT: ICD-10-CM

## 2017-08-24 DIAGNOSIS — M25.659 DECREASED RANGE OF MOTION OF HIP: ICD-10-CM

## 2017-08-24 DIAGNOSIS — R53.1 DECREASED STRENGTH: ICD-10-CM

## 2017-08-24 DIAGNOSIS — Z74.09 IMPAIRED MOBILITY: ICD-10-CM

## 2017-08-24 DIAGNOSIS — M16.11 OSTEOARTHRITIS OF RIGHT HIP, UNSPECIFIED OSTEOARTHRITIS TYPE: ICD-10-CM

## 2017-08-24 PROCEDURE — 97140 MANUAL THERAPY 1/> REGIONS: CPT | Mod: PN

## 2017-08-24 PROCEDURE — 97110 THERAPEUTIC EXERCISES: CPT | Mod: PN

## 2017-08-24 NOTE — PROGRESS NOTES
"TIME RECORD    Date:  08/24/2017    Start Time:  1500  Stop Time:   1600      PROCEDURES:    TIMED  Procedure Min.   MT 15   TE 30   TE Supervised 15     UNTIMED  Procedure Min.             Total Timed Minutes: 45  Total Timed Units:  3  Total Untimed Units:  0  Charges Billed/# of units:  2 (MT-1, TE-2)      Progress/Current Status    Subjective:     Patient ID: Patrick Christy is a 67 y.o. male.  Diagnosis:   1. Pain of right hip joint     2. Decreased range of motion of hip     3. Decreased strength     4. Impaired mobility     5. Osteoarthritis of right hip, unspecified osteoarthritis type       Pain: 1/10 in R hip currently  Pt reports "some pain" today.  ; 0/10 following interventions  Objective:     Pt initiated treatment on NuStep x 8' for active warmup f/b supervised TE x 17' f/b MT x 15' including long-axis distraction and lateral hip mobs then prone for P-A mobs Grade IV f/b TE 1:1 with PTA per log x 30'    Date  8/24/17 8/22/17 8/17/17 8/15/17 8/8/17 08/03/17 8/01/17 7/26/17 7/20/17   VISIT  9 8 7 6 5 4 3 2   Gcode 10 9/10 8/10 7/10 6/10 5/10 4/10 3/10 2/10   FOTO DONE     DONE 4/5 3/5 2/5   Cap Visit   Cap Total 93.82  1020.81 61.84  926.99 124.33 125.80  740.82 127.92  615.02 95.94  487.10 61.84  391. 16 125.80  329.32  95.94  203.52   MT 15' 15' 20' 17' -- -- 15' 15' --   Stretches 3 x 30" B    3 x 30" B    3 x 30" B Sup HSS 3 x30" B  Sudarshan quad  3 x30" B  Piriformis  3 x 30" B   Sup HSS 3 x30" B  Sudarshan quad  3 x30" B  Piriformis  3 x 30" B  Calf EOS 3 x 30" B Standing  HS 2x1'  Quad 2x 1'  Supine  Piriformis 3x30" Supine  Piriformis  HS  Sudarshan quad  3 x 30" all Supine  Piriformis  HS  Stretches strap.Thomasposition quad  3x30" B all Supine  Piriformis  HSS w/strap  Hip flex  3 x 30" all Supine:  Piriformis  HS c/ strap  ITB c/ strap  Hip flexor  3x30" all R HS-stairs  3x30"   Bike 10' L 6   -- 10' L5 10' 5' -- Sci Fit  L5 x 5 min   SLR    2x10 B  2x 12 B 2 x 12 B 2x12 B 2x10 B   SL Abd    HELD  2 x " "12 B 2 x 10 B 2x10 B Clams 2x12 B   Clams supine    2x15 GTB  ^ next 2x15 GTB 2 x 15 GTB 2 x 10 B GTB 2x10 B  2x12 B   Socorro Hip Add    5" x 15 w/ball 5" x 15 w/ball 5" x15 w/ball 5" x 15 5"x15 5"x10   Bridges      3" x 20 3" x 20 3"x2x12 2x10   LAQs         --   Seated Hip Flex         --   Cybex   Leg Press 8.5  2 x 15 B  8.5 2 x 15 B 6.5 pl  2x15 DL 2 x 15 65#    --   TKE         --   Hip Abd 2 x 15 B 2.0  2 x 15 B 2.0 2 pl 2x10 B 2 x 10 20#    --   Hip Flex         --   Hip Ext 2x15 B 2.0  2 x 15 B 2.0 2 pl 2x10 B 2 x 10 20#    --   HS Curls         --   Knee Ext         --   Step Ups    oot 2 x 10    --   BOSU lateral weight shifts  x 20           Step Downs  2x10 on Bosu  oot 2 x 10    --   Step bkwd.fwd  BTB resistance 2 laps 2 laps  oot 2 x 10       Side step with BTB resistance   2 laps  oot 3 laps       Tandem walk on foam beam 3 laps           Side step on foam beam  4 laps           Wall squats  2x15 c PB     oot Mini squats against theraball 2x10    Gait         --   CP         --   Initials MB 1/6 KV  CC MB 3/6 MB 2/6 MB 1/6 CC DH 1/6       Assessment:     Pt tolerated treatment well with reports of decreased pain to 0/10 following interventions.     Patient Education/Response:     Cont HEP.    Plans and Goals:     Continue PT per POC, progress as able.    Short Term Goals (4 Weeks): 8/10/17  1. Pt will be compliant with HEP to supplement PT treatment in improving pain free mobility.  2. Pt will improve B HS length to -20 deg knee extension to improve walking mechanics.  3. Pt will improve R hip extension MMT to 4+/5 to increase hip support during gait performance.  4. Pt will ambulate without limp in to demonstrate improved pain with walking.  Long Term Goals (8 Weeks): 9/7/17  1. Pt will improve FOTO Hip score to </=33% to decrease perceived limitation with functional mobility  2. Pt will improve B HS length to </=15 deg knee extension to improve walking mechanics.  3. Pt will report pain </=2/10 at " worst with walking to improve functional QOL.  4. Pt will improve lumbar side flexion to 25 deg B to decrease mechanical stresses placed on hip with mobility

## 2017-09-14 ENCOUNTER — DOCUMENTATION ONLY (OUTPATIENT)
Dept: REHABILITATION | Facility: HOSPITAL | Age: 67
End: 2017-09-14

## 2017-09-14 DIAGNOSIS — Z74.09 IMPAIRED MOBILITY: ICD-10-CM

## 2017-09-14 DIAGNOSIS — M25.659 DECREASED RANGE OF MOTION OF HIP: ICD-10-CM

## 2017-09-14 DIAGNOSIS — M25.551 PAIN OF RIGHT HIP JOINT: ICD-10-CM

## 2017-09-14 DIAGNOSIS — R53.1 DECREASED STRENGTH: ICD-10-CM

## 2017-09-14 DIAGNOSIS — M16.11 OSTEOARTHRITIS OF RIGHT HIP, UNSPECIFIED OSTEOARTHRITIS TYPE: ICD-10-CM

## 2017-09-14 NOTE — PROGRESS NOTES
Pt was evaluated on 17 and was seen 10 times for PT. Pt has not attended PT since 17. Left message that pt's POC  (17) and if he wished to participate in PT again he would need to contact his MD for a referral. Most recent HEP educated to pt included standing quad and HS stretch, supine piriformis stretch, SLR, hip adduction isometric, and clamshells with GTB. Current status is unknown. Pt to be d/c'd at this time.

## 2018-07-07 ENCOUNTER — HOSPITAL ENCOUNTER (EMERGENCY)
Facility: HOSPITAL | Age: 68
Discharge: HOME | End: 2018-07-07
Attending: EMERGENCY MEDICINE
Payer: MEDICARE

## 2018-07-07 VITALS
HEART RATE: 63 BPM | RESPIRATION RATE: 13 BRPM | WEIGHT: 205 LBS | DIASTOLIC BLOOD PRESSURE: 91 MMHG | TEMPERATURE: 98 F | SYSTOLIC BLOOD PRESSURE: 138 MMHG | OXYGEN SATURATION: 100 % | HEIGHT: 68 IN | BODY MASS INDEX: 31.07 KG/M2

## 2018-07-07 DIAGNOSIS — R07.9 CHEST PAIN: ICD-10-CM

## 2018-07-07 DIAGNOSIS — R07.9 CHEST PAIN, UNSPECIFIED TYPE: Primary | ICD-10-CM

## 2018-07-07 LAB
ALBUMIN SERPL BCP-MCNC: 4.1 G/DL
ALP SERPL-CCNC: 93 U/L
ALT SERPL W/O P-5'-P-CCNC: 19 U/L
ANION GAP SERPL CALC-SCNC: 12 MMOL/L
AST SERPL-CCNC: 21 U/L
BASOPHILS # BLD AUTO: 0.04 K/UL
BASOPHILS NFR BLD: 0.6 %
BILIRUB SERPL-MCNC: 0.4 MG/DL
BUN SERPL-MCNC: 12 MG/DL
CALCIUM SERPL-MCNC: 10.3 MG/DL
CHLORIDE SERPL-SCNC: 103 MMOL/L
CO2 SERPL-SCNC: 22 MMOL/L
CREAT SERPL-MCNC: 1 MG/DL
DIFFERENTIAL METHOD: ABNORMAL
EOSINOPHIL # BLD AUTO: 0.2 K/UL
EOSINOPHIL NFR BLD: 3 %
ERYTHROCYTE [DISTWIDTH] IN BLOOD BY AUTOMATED COUNT: 12 %
EST. GFR  (AFRICAN AMERICAN): >60 ML/MIN/1.73 M^2
EST. GFR  (NON AFRICAN AMERICAN): >60 ML/MIN/1.73 M^2
GLUCOSE SERPL-MCNC: 96 MG/DL
HCT VFR BLD AUTO: 43.3 %
HGB BLD-MCNC: 14.7 G/DL
LYMPHOCYTES # BLD AUTO: 3.3 K/UL
LYMPHOCYTES NFR BLD: 52.6 %
MCH RBC QN AUTO: 30.6 PG
MCHC RBC AUTO-ENTMCNC: 33.9 G/DL
MCV RBC AUTO: 90 FL
MONOCYTES # BLD AUTO: 0.7 K/UL
MONOCYTES NFR BLD: 10.8 %
NEUTROPHILS # BLD AUTO: 2.1 K/UL
NEUTROPHILS NFR BLD: 33 %
PLATELET # BLD AUTO: 335 K/UL
PMV BLD AUTO: 10.3 FL
POTASSIUM SERPL-SCNC: 4 MMOL/L
PROT SERPL-MCNC: 8.2 G/DL
RBC # BLD AUTO: 4.8 M/UL
SODIUM SERPL-SCNC: 137 MMOL/L
TROPONIN I SERPL DL<=0.01 NG/ML-MCNC: <0.006 NG/ML
WBC # BLD AUTO: 6.23 K/UL

## 2018-07-07 PROCEDURE — 85025 COMPLETE CBC W/AUTO DIFF WBC: CPT

## 2018-07-07 PROCEDURE — 80053 COMPREHEN METABOLIC PANEL: CPT

## 2018-07-07 PROCEDURE — 93005 ELECTROCARDIOGRAM TRACING: CPT

## 2018-07-07 PROCEDURE — 84484 ASSAY OF TROPONIN QUANT: CPT

## 2018-07-07 PROCEDURE — 99284 EMERGENCY DEPT VISIT MOD MDM: CPT | Mod: 25

## 2018-07-07 PROCEDURE — 93010 ELECTROCARDIOGRAM REPORT: CPT | Mod: ,,, | Performed by: INTERNAL MEDICINE

## 2018-07-07 RX ORDER — ASPIRIN 325 MG
325 TABLET ORAL
Status: DISCONTINUED | OUTPATIENT
Start: 2018-07-07 | End: 2018-07-07 | Stop reason: HOSPADM

## 2018-07-07 NOTE — ED NOTES
"Pt c/o CP starting 45min PTA, has had CP like this before and unable to find source per pt last time this event happened. Endorses pain radiating to R shoulder, endorses SOB with pain, pain is "pressure" pt walking on treadmill this morning "I felt fine" took 2 325mg ASA PTA. Per pt "my pain is better since coming in."   "

## 2018-07-07 NOTE — DISCHARGE INSTRUCTIONS
A heart attack has not been completely ruled out.  You should return to ER immediately if your chest pain returns.  Take a 325mg aspirin daily and see your doctor this week.

## 2018-07-07 NOTE — ED PROVIDER NOTES
Encounter Date: 7/7/2018    SCRIBE #1 NOTE: I, Dagoberto Butt, am scribing for, and in the presence of,  Dr. Morris. I have scribed the entire note.       History     Chief Complaint   Patient presents with    Chest Pain     chest pain with n/v started 45 min ago, pt complains of shortness of breath and pain that started in the left side of his chest but moved to midsternal area.      Patrick Christy is a 67 y.o. male who  has a past medical history of Depression and Thyroid disease.    The patient presents to the ED due to sudden onset CP beginning at rest after walking on the treadmill.  Pt just finished his work out when symptoms began about 45 minutes ago. He notes that his CP originated on the right side, but moved to the midsternal area; also reports left shoulder pain and bilateral leg numbness.  The patient reports associated nausea, vomiting, dizziness, bilateral arm numbness, lightheadedness, and SOB with onset of his symptoms.  Patient denies any diaphoresis with onset of his symptoms. He has a history of vertigo and has had similar episodes of dizziness and lightheadedness in the past. However, he does note the severity of today's episode was worse than usual. He denies any pain at that time; his CP resolved on arrival to ER. Patient reports taking 2 Aspirin 325 mg on the way to the ED.      The history is provided by the patient.     Review of patient's allergies indicates:   Allergen Reactions    Iodine and iodide containing products Anaphylaxis     Past Medical History:   Diagnosis Date    Depression     Thyroid disease      Past Surgical History:   Procedure Laterality Date    NASAL SEPTUM SURGERY       History reviewed. No pertinent family history.  Social History   Substance Use Topics    Smoking status: Never Smoker    Smokeless tobacco: Not on file    Alcohol use Yes      Comment: social drinker     Review of Systems   Constitutional: Negative for diaphoresis.   Respiratory: Positive for  shortness of breath.    Cardiovascular: Positive for chest pain.   Gastrointestinal: Positive for nausea and vomiting.   Musculoskeletal: Positive for arthralgias (left shoulder pain).   Neurological: Positive for dizziness, light-headedness and numbness.   All other systems reviewed and are negative.    Physical Exam     Initial Vitals   BP Pulse Resp Temp SpO2   07/07/18 1132 07/07/18 1132 07/07/18 1132 07/07/18 1130 07/07/18 1132   (!) 190/92 71 (!) 22 98.1 °F (36.7 °C) 100 %      MAP       --                Physical Exam    Nursing note and vitals reviewed.  Constitutional: He appears well-developed and well-nourished. No distress.   HENT:   Head: Normocephalic and atraumatic.   Eyes: Conjunctivae are normal.   Neck: Normal range of motion.   Cardiovascular: Normal rate, regular rhythm and normal heart sounds.   No murmur heard.  Pulmonary/Chest: Breath sounds normal. No respiratory distress.   Abdominal: Bowel sounds are normal. He exhibits no distension. There is no tenderness.   Musculoskeletal: Normal range of motion. He exhibits no edema or tenderness.   Neurological: He is alert and oriented to person, place, and time.   Skin: Skin is warm and dry.   Psychiatric: He has a normal mood and affect. His behavior is normal.       ED Course   Procedures  Labs Reviewed   CBC W/ AUTO DIFFERENTIAL - Abnormal; Notable for the following:        Result Value    Gran% 33.0 (*)     Lymph% 52.6 (*)     All other components within normal limits   COMPREHENSIVE METABOLIC PANEL - Abnormal; Notable for the following:     CO2 22 (*)     All other components within normal limits   TROPONIN I     EKG Readings: (Independently Interpreted)   Initial Reading: No STEMI. Rhythm: Normal Sinus Rhythm. Heart Rate: 67. Ectopy: No Ectopy. Conduction: Normal. ST Segments: Normal ST Segments. T Waves: Normal. Clinical Impression: Normal Sinus Rhythm       X-Rays:   Independently Interpreted Readings:   Other Readings:  Reviewed by myself,  read by radiology.     Imaging Results          X-Ray Chest PA And Lateral (Final result)  Result time 07/07/18 12:07:15    Final result by Sarai Ferris MD (07/07/18 12:07:15)                 Impression:      No detrimental change since January 14, 2016.      Electronically signed by: Sarai Ferris MD  Date:    07/07/2018  Time:    12:07             Narrative:    EXAMINATION:  XR CHEST PA AND LATERAL    CLINICAL HISTORY:  chest pain;    TECHNIQUE:  PA and lateral views of the chest were performed.    COMPARISON:  January 14, 2016    FINDINGS:  The lungs are clear, with normal appearance of pulmonary vasculature and no pleural effusion or pneumothorax.    The cardiac silhouette is normal in size. The hilar and mediastinal contours are unremarkable.    There are mild degenerative changes of the visualized spine.  There are atherosclerotic calcifications of the aorta.                              Medical Decision Making:   Independently Interpreted Test(s):   I have ordered and independently interpreted EKG Reading(s) - see prior notes  Clinical Tests:   Lab Tests: Ordered and Reviewed  Radiological Study: Ordered and Reviewed  Medical Tests: Ordered and Reviewed  ED Management:  67M with CP that began shortly after exercising.  No STEMI or other acute ischemic changes on EKG.  Normal troponin at this time.  Plan to obs for serial cardiac markers. I talked with pt regarding my recommendations for admission and serial markers.  Pt states he would rather go home.  He feels fine and does not want to be admitted.  I explained that I am concerned about his CP and associated symptoms and highly recommend overnight observation.  Pt still prefers not to stay.  He agrees to return if his CP returns.  His wife was present for this discussion and supported his decision.                      Clinical Impression:     1. Chest pain, unspecified type    2. Chest pain            I, Dr. Karine Morris, personally performed the  services described in this documentation.   All medical record entries made by the scribe were at my direction and in my presence.   I have reviewed the chart and agree that the record is accurate and complete.   Karine Morris MD.  12:48 PM 07/07/2018        Karine Morris MD  07/07/18 1248

## 2018-07-07 NOTE — ED NOTES
"AVS reviewed with pt. Warning signs to come back to ED reviewed, pt insistent to leave ED, discussed risks with pt and pt still wanting leave. "I feel fine. I feel back to normal" pt to follow up with PCP ASAP. DC home with wife.   "

## 2019-03-06 ENCOUNTER — OFFICE VISIT (OUTPATIENT)
Dept: URGENT CARE | Facility: CLINIC | Age: 69
End: 2019-03-06
Payer: MEDICARE

## 2019-03-06 VITALS
SYSTOLIC BLOOD PRESSURE: 152 MMHG | WEIGHT: 205 LBS | RESPIRATION RATE: 18 BRPM | DIASTOLIC BLOOD PRESSURE: 92 MMHG | HEIGHT: 68 IN | TEMPERATURE: 99 F | BODY MASS INDEX: 31.07 KG/M2 | HEART RATE: 67 BPM | OXYGEN SATURATION: 97 %

## 2019-03-06 DIAGNOSIS — R03.0 ELEVATED BLOOD PRESSURE READING: ICD-10-CM

## 2019-03-06 DIAGNOSIS — M25.571 RIGHT ANKLE PAIN, UNSPECIFIED CHRONICITY: Primary | ICD-10-CM

## 2019-03-06 PROCEDURE — 96372 THER/PROPH/DIAG INJ SC/IM: CPT | Mod: S$GLB,,, | Performed by: PHYSICIAN ASSISTANT

## 2019-03-06 PROCEDURE — 96372 PR INJECTION,THERAP/PROPH/DIAG2ST, IM OR SUBCUT: ICD-10-PCS | Mod: S$GLB,,, | Performed by: PHYSICIAN ASSISTANT

## 2019-03-06 PROCEDURE — 99203 OFFICE O/P NEW LOW 30 MIN: CPT | Mod: 25,S$GLB,, | Performed by: PHYSICIAN ASSISTANT

## 2019-03-06 PROCEDURE — 73610 X-RAY EXAM OF ANKLE: CPT | Mod: FY,RT,S$GLB, | Performed by: RADIOLOGY

## 2019-03-06 PROCEDURE — 99203 PR OFFICE/OUTPT VISIT, NEW, LEVL III, 30-44 MIN: ICD-10-PCS | Mod: 25,S$GLB,, | Performed by: PHYSICIAN ASSISTANT

## 2019-03-06 PROCEDURE — 73610 XR ANKLE COMPLETE 3 VIEW RIGHT: ICD-10-PCS | Mod: FY,RT,S$GLB, | Performed by: RADIOLOGY

## 2019-03-06 RX ORDER — MELOXICAM 7.5 MG/1
TABLET ORAL
Refills: 1 | COMMUNITY
Start: 2019-01-09 | End: 2024-03-15

## 2019-03-06 RX ORDER — BETAMETHASONE SODIUM PHOSPHATE AND BETAMETHASONE ACETATE 3; 3 MG/ML; MG/ML
6 INJECTION, SUSPENSION INTRA-ARTICULAR; INTRALESIONAL; INTRAMUSCULAR; SOFT TISSUE
Status: COMPLETED | OUTPATIENT
Start: 2019-03-06 | End: 2019-03-06

## 2019-03-06 RX ADMIN — BETAMETHASONE SODIUM PHOSPHATE AND BETAMETHASONE ACETATE 6 MG: 3; 3 INJECTION, SUSPENSION INTRA-ARTICULAR; INTRALESIONAL; INTRAMUSCULAR; SOFT TISSUE at 02:03

## 2019-03-06 NOTE — PATIENT INSTRUCTIONS
If you were prescribed a narcotic or controlled medication, do not drive or operate heavy equipment or machinery while taking these medications.  You must understand that you've received an Urgent Care treatment only and that you may be released before all your medical problems are known or treated. You, the patient, will arrange for follow up care as instructed.  Follow up with your PCP or specialty clinic as directed if not improved or as needed. You can call [phone] to schedule an appointment with the appropriate provider.  If your condition worsens we recommend that you receive another evaluation at the Emergency Department for any concerns or worsening of condition.    You received a steroid shot today - this can elevate your blood pressure, elevate your blood sugar, water weight gain, nervous energy, redness to the face and dimpling of the skin where the shot goes in.     Patient's BP is elevated today. No headache, Chest pain, or SOB. Patient has been advised to monitor the BP for the next few days. If it stays elevated, patient should contact their PCP.    Ankle Pain  Advised to please take Tylenol as directed for pain.  RICE which means rest, ice compression and elevation are helpful.   If you have Low Back Pain and develop bowel or bladder symptoms or increase pain going down your legs go to the ER immediately.   If you were given a splint wear it at all times.   If you were given crutches use them as we instructed. Do not rest your armpits on the foam pad or you risk compressing the nerves and the vessels there   Follow up with the orthopedist in 1 week if you continue with pain.   Sometimes it can take up to 1 week for stress fractures to show up on an X-ray, and may need reimaging or a CT or MRI of the affected area.  Patient aware and verbalized understanding.    Ankle Sprain (Adult)  An ankle sprain is a stretching or tearing of the ligaments that hold the ankle joint together. There are no  broken bones.  An ankle sprain is a common injury for both children and adults. It happens when the ankle turns, twists, or rolls in an awkward way. This can be caused by a sports injury. Or it can happen from doing something as simple as stepping on an uneven surface.  Ligaments are made of tough connective tissue. Normally, ligaments stretch a certain amount and then go back to their normal place. A sprain happens when a ligament is forced to stretch more than the normal amount. A severe sprain can actually tear the ligaments. If you have a severe sprain, you may have felt or heard something like a pop when you were injured.  Ankle sprains are given a grade depending on whether they are mild, moderate, or severe:  · Grade 1 sprain. A mild sprain with minor stretching and damage to the ligament.  · Grade 2 sprain. A moderate sprain where the ligament is partly torn.  · Grade 3 sprain. The most severe kind of sprain. The ligament is completely torn.  Most sprains take about 4 to 6 weeks to heal. A severe sprain can take several months to recover.  Your healthcare provider may order X-rays to be sure you dont have a fracture, or broken bone.  The injured area will feel sore.  Swelling and pain may make it hard to walk. You may need crutches if walking is painful. Or your provider may have you use a cast boot or air splint. This will depend on the grade of ankle sprain that you have.    Home care  · For a Grade 1 sprain, use RICE (rest, ice, compression, and elevation):  · Rest your ankle. Dont walk on it.  · Ice should be used right away to help control swelling. Place an ice pack over the injured area for 20 minutes. Do this every 3 to 6 hours for the first 24 to 48 hours. Keep using ice packs to ease pain and swelling as needed. To make an ice pack, put ice cubes in a plastic bag that seals at the top. Wrap the bag in a clean, thin towel or cloth. Never put ice or an ice pack directly on the skin. The ice pack  can be put right on the cast, bandage, or splint. As the ice melts, be careful that the cast, bandage, or splint doesnt get wet. If you have a boot, open it to apply an ice pack, unless told otherwise by your provider.  · Compression devices help to control swelling. They also keep the ankle from moving and support your injured ankle. These devices include dressings, bandages, and wraps.  · Elevate or raise your ankle above the level of your heart when sitting or lying down. This is very important for the first 48 hours.  · Follow the RICE guidelines for a Grade 2 sprain. This type of sprain will take longer to heal. Your provider may have you wear a splint, cast, or brace to keep your ankle from moving.  ·  If you have a Grade 3 sprain, you are at risk for long-term ankle instability. In rare cases, surgery may be needed. Your provider may have you wear a short leg cast or a walking boot for 2 to 3 weeks.  · After 48 hours, it may be helpful to apply heat for 20 minutes several times a day. You can do this with a heating pad or warm compress. Or you may want to go back and forth between using ice and heat. Never apply heat directly to the skin. Always wrap the heating pad or warm compress in a clean, thin towel or cloth.  · You may use over-the-counter pain medicine (NSAIDS or nonsteroidal anti-inflammatory drugs) to control pain, unless another pain medicine was prescribed. Talk with your provider before using these medicines if you have chronic liver or kidney disease, or have ever had a stomach ulcer or GI (gastrointestinal) bleeding.  · Follow any rehabilitation exercises your provider gives you. These can help you be more flexible and improve your balance and coordination. This is helpful in preventing long-term ankle problems.  Prevention  To help prevent ankle sprains, its important to have good strength, balance, and flexibility. Be sure to:  · Always warm up before you exercise or do something very  active  · Be careful when walking or running on uneven or cracked surfaces  · Wear shoes that are in good condition and fit well  · Listen to your bodys signals to slow down when you are in pain or tired  Follow-up care  Any X-rays you had today dont show any broken bones, breaks, or fractures. Sometimes fractures dont show up on the first X-ray. Bruises and sprains can sometimes hurt as much as a fracture. These injuries can take time to heal completely. If your symptoms dont get better or they get worse, talk with your healthcare provider. You may need a repeat X-ray.  Follow up with your healthcare provider, or as advised. Check for any warning signs listed below.  When to seek medical advice  Call your healthcare provider right away if any of these occur:  · Fever of 100.4 F (38 C) or higher, or as directed by your healthcare provider  · The injury doesnt seem to be healing  · The swelling comes back  · The cast has a bad smell  · The plaster cast or splint gets wet or soft  · The fiberglass cast or splint gets wet and does not dry for 24 hours  · The pain or swelling increases, or redness appears  · Your toes become cold, blue, numb, or tingly  · The skin is discolored (looks blue, purple, or gray), has blisters, or is irritated  · You re-injure your ankle  Date Last Reviewed: 11/20/2015  © 1370-6954 [employer]. [address] [place] [address]. All rights reserved. This information is not intended as a substitute for professional medical care. Always follow your healthcare professional's instructions.        R.I.C.E.    R.I.C.E. stands for Rest, Ice, Compression, and Elevation. Doing these things helps limit pain and swelling after an injury. R.I.C.E. also helps injuries heal faster. Use R.I.C.E. for sprains, strains, and severe bruises or bumps. Follow the tips on this handout and begin R.I.C.E. as soon as possible after an injury.  ? Rest  Pain is your bodys way of telling you  to rest an injured area. Whether you have hurt an elbow, hand, foot, or knee, limiting its use will prevent further injury and help you heal.  ? Ice  Applying ice right after an injury helps prevent swelling and reduce pain. Dont place ice directly on your skin.  · Wrap a cold pack or bag of ice in a thin cloth. Place it over the injured area.  · Ice for 10 minutes every 3 hours. Dont ice for more than 20 minutes at a time.  ? Compression  Putting pressure (compression) on an injury helps prevent swelling and provides support.  · Wrap the injured area firmly with an elastic bandage. If your hand or foot tingles, becomes discolored, or feels cold to the touch, the bandage may be too tight. Rewrap it more loosely.  · If your bandage becomes too loose, rewrap it.  · Do not wear an elastic bandage overnight.  ? Elevation  Keeping an injury elevated helps reduce swelling, pain, and throbbing. Elevation is most effective when the injury is kept elevated higher than the heart.     Call your healthcare provider if you notice any of the following:  · Fingers or toes feel numb, are cold to the touch, or change color  · Skin looks shiny or tight  · Pain, swelling, or bruising worsens and is not improved with elevation   Date Last Reviewed: 9/3/2015  © 5986-7634 [employer]. [address] [place] [address], [place] [address]. All rights reserved. This information is not intended as a substitute for professional medical care. Always follow your healthcare professional's instructions.

## 2019-03-06 NOTE — PROGRESS NOTES
"Subjective:       Patient ID: Patrick Christy is a 68 y.o. male.    Vitals:  height is 5' 8" (1.727 m) and weight is 93 kg (205 lb). His temperature is 99.2 °F (37.3 °C). His blood pressure is 152/92 (abnormal) and his pulse is 67. His respiration is 18 and oxygen saturation is 97%.     Chief Complaint: Ankle Pain (rt pain)    Patient presents with wife on exam. Patient reports right ankle pain x 3 days ago. Patient reports that he was in the attic and might have twisted his ankle. Patient denies prior injury to ankle. Patient reports that he has tried rest, ice, compression and elevation to the right ankle with improvement, but just wants to make sure his ankle is not broken. Patient tried to get an appointment with his PCP, but PCP is unavailable at this time until Monday (in 5 days). Patient denies fever, CP, SOB, wheezing, abdominal pain, N/V/D, numbness, tingling, headache, dizziness, blurry vision or syncope.      Ankle Pain    The incident occurred 5 to 7 days ago. The incident occurred at home. There was no injury mechanism. The pain is present in the right ankle. The quality of the pain is described as aching. The pain is at a severity of 4/10. The pain is mild. The pain has been intermittent since onset. He reports no foreign bodies present. The symptoms are aggravated by movement and palpation. He has tried elevation and ice for the symptoms. The treatment provided mild relief.       Constitution: Negative for chills, sweating, fatigue and fever.   HENT: Negative for ear pain, congestion and sore throat.    Neck: Negative for neck pain and painful lymph nodes.   Cardiovascular: Negative for chest pain and leg swelling.   Eyes: Negative for eye pain, photophobia, double vision and blurred vision.   Respiratory: Negative for chest tightness, cough, sputum production, bloody sputum, COPD, shortness of breath, stridor and wheezing.    Gastrointestinal: Negative for nausea, vomiting and diarrhea. "   Genitourinary: Negative for dysuria, frequency and urgency.   Musculoskeletal: Positive for joint pain and joint swelling. Negative for back pain, pain with walking, muscle cramps and muscle ache.   Skin: Negative for color change, pale, rash, wound, abrasion, laceration, lesion and puncture wound.   Allergic/Immunologic: Negative for seasonal allergies.   Neurological: Negative for dizziness, history of vertigo, light-headedness, passing out, facial drooping, speech difficulty, loss of balance, headaches, altered mental status and loss of consciousness.   Hematologic/Lymphatic: Negative for swollen lymph nodes, easy bruising/bleeding and history of blood clots. Does not bruise/bleed easily.   Psychiatric/Behavioral: Negative for altered mental status, nervous/anxious, sleep disturbance and depression. The patient is not nervous/anxious.        Objective:      Physical Exam   Constitutional: He is oriented to person, place, and time. Vital signs are normal. He appears well-developed and well-nourished. He is active and cooperative. No distress.   HENT:   Head: Normocephalic and atraumatic.   Right Ear: Hearing, tympanic membrane, external ear and ear canal normal.   Left Ear: Hearing, tympanic membrane, external ear and ear canal normal.   Nose: Nose normal.   Mouth/Throat: Oropharynx is clear and moist and mucous membranes are normal.   Eyes: Conjunctivae and lids are normal.   Neck: Trachea normal, normal range of motion, full passive range of motion without pain and phonation normal. Neck supple.   Cardiovascular: Normal rate, regular rhythm, normal heart sounds, intact distal pulses and normal pulses.   Pulmonary/Chest: Effort normal and breath sounds normal. No accessory muscle usage or stridor. He has no decreased breath sounds. He has no wheezes. He has no rhonchi. He has no rales.   Abdominal: Soft. Normal appearance and bowel sounds are normal. He exhibits no abdominal bruit, no pulsatile midline mass and  no mass.   Musculoskeletal: He exhibits no edema or deformity.        Right ankle: He exhibits swelling and ecchymosis. He exhibits normal range of motion, no deformity, no laceration and normal pulse. Tenderness. Medial malleolus tenderness found. No lateral malleolus, no AITFL, no CF ligament, no posterior TFL, no head of 5th metatarsal and no proximal fibula tenderness found. Achilles tendon normal.   FROM to upper and lower extremities bilateral. 5/5 strength and full sensation bilateral. 2+ pulses bilateral. No numbness or tingling. Able to ambulate without difficulty.   Lymphadenopathy:     He has no cervical adenopathy.   Neurological: He is alert and oriented to person, place, and time. He has normal strength and normal reflexes. No cranial nerve deficit or sensory deficit. He displays a negative Romberg sign.   Skin: Skin is warm, dry and intact. No rash noted. He is not diaphoretic.   Psychiatric: He has a normal mood and affect. His speech is normal and behavior is normal. Judgment and thought content normal. Cognition and memory are normal.   Nursing note and vitals reviewed.        X-ray Ankle Complete Right    Result Date: 3/6/2019  EXAMINATION: XR ANKLE COMPLETE 3 VIEW RIGHT CLINICAL HISTORY: Pain in right ankle and joints of right foot FINDINGS: Right: No fracture dislocation bone destruction seen.  There is a spur on the calcaneus.     See above Electronically signed by: Vijay Murillo MD Date:    03/06/2019 Time:    14:09    Assessment:       1. Right ankle pain, unspecified chronicity    2. Elevated blood pressure reading        Plan:         Right ankle pain, unspecified chronicity  -     X-Ray Ankle Complete Right; Future; Expected date: 03/06/2019    Elevated blood pressure reading    Other orders  -     betamethasone acetate-betamethasone sodium phosphate injection 6 mg      Patient Instructions     If you were prescribed a narcotic or controlled medication, do not drive or operate heavy  equipment or machinery while taking these medications.  You must understand that you've received an Urgent Care treatment only and that you may be released before all your medical problems are known or treated. You, the patient, will arrange for follow up care as instructed.  Follow up with your PCP or specialty clinic as directed if not improved or as needed. You can call [phone] to schedule an appointment with the appropriate provider.  If your condition worsens we recommend that you receive another evaluation at the Emergency Department for any concerns or worsening of condition.    You received a steroid shot today - this can elevate your blood pressure, elevate your blood sugar, water weight gain, nervous energy, redness to the face and dimpling of the skin where the shot goes in.     Patient's BP is elevated today. No headache, Chest pain, or SOB. Patient has been advised to monitor the BP for the next few days. If it stays elevated, patient should contact their PCP.    Ankle Pain  Advised to please take Tylenol as directed for pain.  RICE which means rest, ice compression and elevation are helpful.   If you have Low Back Pain and develop bowel or bladder symptoms or increase pain going down your legs go to the ER immediately.   If you were given a splint wear it at all times.   If you were given crutches use them as we instructed. Do not rest your armpits on the foam pad or you risk compressing the nerves and the vessels there   Follow up with the orthopedist in 1 week if you continue with pain.   Sometimes it can take up to 1 week for stress fractures to show up on an X-ray, and may need reimaging or a CT or MRI of the affected area.  Patient aware and verbalized understanding.    Ankle Sprain (Adult)  An ankle sprain is a stretching or tearing of the ligaments that hold the ankle joint together. There are no broken bones.  An ankle sprain is a common injury for both children and adults. It happens  when the ankle turns, twists, or rolls in an awkward way. This can be caused by a sports injury. Or it can happen from doing something as simple as stepping on an uneven surface.  Ligaments are made of tough connective tissue. Normally, ligaments stretch a certain amount and then go back to their normal place. A sprain happens when a ligament is forced to stretch more than the normal amount. A severe sprain can actually tear the ligaments. If you have a severe sprain, you may have felt or heard something like a pop when you were injured.  Ankle sprains are given a grade depending on whether they are mild, moderate, or severe:  · Grade 1 sprain. A mild sprain with minor stretching and damage to the ligament.  · Grade 2 sprain. A moderate sprain where the ligament is partly torn.  · Grade 3 sprain. The most severe kind of sprain. The ligament is completely torn.  Most sprains take about 4 to 6 weeks to heal. A severe sprain can take several months to recover.  Your healthcare provider may order X-rays to be sure you dont have a fracture, or broken bone.  The injured area will feel sore.  Swelling and pain may make it hard to walk. You may need crutches if walking is painful. Or your provider may have you use a cast boot or air splint. This will depend on the grade of ankle sprain that you have.    Home care  · For a Grade 1 sprain, use RICE (rest, ice, compression, and elevation):  · Rest your ankle. Dont walk on it.  · Ice should be used right away to help control swelling. Place an ice pack over the injured area for 20 minutes. Do this every 3 to 6 hours for the first 24 to 48 hours. Keep using ice packs to ease pain and swelling as needed. To make an ice pack, put ice cubes in a plastic bag that seals at the top. Wrap the bag in a clean, thin towel or cloth. Never put ice or an ice pack directly on the skin. The ice pack can be put right on the cast, bandage, or splint. As the ice melts, be careful that the cast,  bandage, or splint doesnt get wet. If you have a boot, open it to apply an ice pack, unless told otherwise by your provider.  · Compression devices help to control swelling. They also keep the ankle from moving and support your injured ankle. These devices include dressings, bandages, and wraps.  · Elevate or raise your ankle above the level of your heart when sitting or lying down. This is very important for the first 48 hours.  · Follow the RICE guidelines for a Grade 2 sprain. This type of sprain will take longer to heal. Your provider may have you wear a splint, cast, or brace to keep your ankle from moving.  ·  If you have a Grade 3 sprain, you are at risk for long-term ankle instability. In rare cases, surgery may be needed. Your provider may have you wear a short leg cast or a walking boot for 2 to 3 weeks.  · After 48 hours, it may be helpful to apply heat for 20 minutes several times a day. You can do this with a heating pad or warm compress. Or you may want to go back and forth between using ice and heat. Never apply heat directly to the skin. Always wrap the heating pad or warm compress in a clean, thin towel or cloth.  · You may use over-the-counter pain medicine (NSAIDS or nonsteroidal anti-inflammatory drugs) to control pain, unless another pain medicine was prescribed. Talk with your provider before using these medicines if you have chronic liver or kidney disease, or have ever had a stomach ulcer or GI (gastrointestinal) bleeding.  · Follow any rehabilitation exercises your provider gives you. These can help you be more flexible and improve your balance and coordination. This is helpful in preventing long-term ankle problems.  Prevention  To help prevent ankle sprains, its important to have good strength, balance, and flexibility. Be sure to:  · Always warm up before you exercise or do something very active  · Be careful when walking or running on uneven or cracked surfaces  · Wear shoes that are  in good condition and fit well  · Listen to your bodys signals to slow down when you are in pain or tired  Follow-up care  Any X-rays you had today dont show any broken bones, breaks, or fractures. Sometimes fractures dont show up on the first X-ray. Bruises and sprains can sometimes hurt as much as a fracture. These injuries can take time to heal completely. If your symptoms dont get better or they get worse, talk with your healthcare provider. You may need a repeat X-ray.  Follow up with your healthcare provider, or as advised. Check for any warning signs listed below.  When to seek medical advice  Call your healthcare provider right away if any of these occur:  · Fever of 100.4 F (38 C) or higher, or as directed by your healthcare provider  · The injury doesnt seem to be healing  · The swelling comes back  · The cast has a bad smell  · The plaster cast or splint gets wet or soft  · The fiberglass cast or splint gets wet and does not dry for 24 hours  · The pain or swelling increases, or redness appears  · Your toes become cold, blue, numb, or tingly  · The skin is discolored (looks blue, purple, or gray), has blisters, or is irritated  · You re-injure your ankle  Date Last Reviewed: 11/20/2015  © 6356-2030 [employer]. [address] [place] [address]. All rights reserved. This information is not intended as a substitute for professional medical care. Always follow your healthcare professional's instructions.        R.I.C.E.    R.I.C.E. stands for Rest, Ice, Compression, and Elevation. Doing these things helps limit pain and swelling after an injury. R.I.C.E. also helps injuries heal faster. Use R.I.C.E. for sprains, strains, and severe bruises or bumps. Follow the tips on this handout and begin R.I.C.E. as soon as possible after an injury.  ? Rest  Pain is your bodys way of telling you to rest an injured area. Whether you have hurt an elbow, hand, foot, or knee, limiting its use will  prevent further injury and help you heal.  ? Ice  Applying ice right after an injury helps prevent swelling and reduce pain. Dont place ice directly on your skin.  · Wrap a cold pack or bag of ice in a thin cloth. Place it over the injured area.  · Ice for 10 minutes every 3 hours. Dont ice for more than 20 minutes at a time.  ? Compression  Putting pressure (compression) on an injury helps prevent swelling and provides support.  · Wrap the injured area firmly with an elastic bandage. If your hand or foot tingles, becomes discolored, or feels cold to the touch, the bandage may be too tight. Rewrap it more loosely.  · If your bandage becomes too loose, rewrap it.  · Do not wear an elastic bandage overnight.  ? Elevation  Keeping an injury elevated helps reduce swelling, pain, and throbbing. Elevation is most effective when the injury is kept elevated higher than the heart.     Call your healthcare provider if you notice any of the following:  · Fingers or toes feel numb, are cold to the touch, or change color  · Skin looks shiny or tight  · Pain, swelling, or bruising worsens and is not improved with elevation   Date Last Reviewed: 9/3/2015  © 3737-3649 [employer]. [address] [place] [address], [place] [address]. All rights reserved. This information is not intended as a substitute for professional medical care. Always follow your healthcare professional's instructions.

## 2019-05-08 ENCOUNTER — TELEPHONE (OUTPATIENT)
Dept: PODIATRY | Facility: CLINIC | Age: 69
End: 2019-05-08

## 2019-05-08 NOTE — TELEPHONE ENCOUNTER
----- Message from Otf Silvestre MA sent at 5/8/2019  9:43 AM CDT -----  Contact: self [phone]      ----- Message -----  From: Papi Santiago  Sent: 5/8/2019   9:28 AM  To: [facility] Staff    Patient would like to be seen today. He is a new patient and foot is so swollen he can't put it down. Please call and advise.

## 2019-05-08 NOTE — TELEPHONE ENCOUNTER
Spoke with patient and he said he is going to see his PCP tomorrow and she gave him some advise on what to do. He stated if it happens again he will give us a call.

## 2019-11-21 ENCOUNTER — TELEPHONE (OUTPATIENT)
Dept: CARDIOLOGY | Facility: CLINIC | Age: 69
End: 2019-11-21

## 2019-11-21 NOTE — TELEPHONE ENCOUNTER
----- Message from Prudencio Allan sent at 11/21/2019  9:27 AM CST -----  Contact: [phone]/ Wife, Denise  Patient requesting to speak with you regarding the patients insurance. Please call.

## 2021-01-04 ENCOUNTER — PATIENT MESSAGE (OUTPATIENT)
Dept: ADMINISTRATIVE | Facility: OTHER | Age: 71
End: 2021-01-04

## 2021-08-13 DIAGNOSIS — Z96.641 PRESENCE OF RIGHT ARTIFICIAL HIP JOINT: Primary | ICD-10-CM

## 2021-08-13 DIAGNOSIS — Z96.641 HISTORY OF HIP REPLACEMENT, TOTAL, RIGHT: ICD-10-CM

## 2021-08-17 ENCOUNTER — CLINICAL SUPPORT (OUTPATIENT)
Dept: REHABILITATION | Facility: HOSPITAL | Age: 71
End: 2021-08-17
Payer: MEDICARE

## 2021-08-17 DIAGNOSIS — R26.9 GAIT ABNORMALITY: ICD-10-CM

## 2021-08-17 DIAGNOSIS — R29.898 WEAKNESS OF RIGHT HIP: ICD-10-CM

## 2021-08-17 PROCEDURE — 97161 PT EVAL LOW COMPLEX 20 MIN: CPT | Mod: PN | Performed by: PHYSICAL THERAPIST

## 2021-08-19 DIAGNOSIS — Z96.641 HISTORY OF TOTAL RIGHT HIP REPLACEMENT: Primary | ICD-10-CM

## 2021-08-20 ENCOUNTER — CLINICAL SUPPORT (OUTPATIENT)
Dept: REHABILITATION | Facility: HOSPITAL | Age: 71
End: 2021-08-20
Payer: MEDICARE

## 2021-08-20 DIAGNOSIS — R26.9 GAIT ABNORMALITY: ICD-10-CM

## 2021-08-20 DIAGNOSIS — R29.898 WEAKNESS OF RIGHT HIP: ICD-10-CM

## 2021-08-20 PROCEDURE — 97530 THERAPEUTIC ACTIVITIES: CPT | Mod: PN | Performed by: PHYSICAL THERAPIST

## 2021-08-20 PROCEDURE — 97110 THERAPEUTIC EXERCISES: CPT | Mod: PN | Performed by: PHYSICAL THERAPIST

## 2021-08-23 ENCOUNTER — CLINICAL SUPPORT (OUTPATIENT)
Dept: REHABILITATION | Facility: HOSPITAL | Age: 71
End: 2021-08-23
Payer: MEDICARE

## 2021-08-23 DIAGNOSIS — R26.9 GAIT ABNORMALITY: ICD-10-CM

## 2021-08-23 DIAGNOSIS — R29.898 WEAKNESS OF RIGHT HIP: ICD-10-CM

## 2021-08-23 PROCEDURE — 97530 THERAPEUTIC ACTIVITIES: CPT | Mod: PN

## 2021-08-23 PROCEDURE — 97110 THERAPEUTIC EXERCISES: CPT | Mod: PN

## 2021-08-24 ENCOUNTER — DOCUMENTATION ONLY (OUTPATIENT)
Dept: REHABILITATION | Facility: HOSPITAL | Age: 71
End: 2021-08-24

## 2021-08-26 ENCOUNTER — CLINICAL SUPPORT (OUTPATIENT)
Dept: REHABILITATION | Facility: HOSPITAL | Age: 71
End: 2021-08-26
Payer: MEDICARE

## 2021-08-26 DIAGNOSIS — R26.9 GAIT ABNORMALITY: ICD-10-CM

## 2021-08-26 DIAGNOSIS — R29.898 WEAKNESS OF RIGHT HIP: ICD-10-CM

## 2021-08-26 PROCEDURE — 97110 THERAPEUTIC EXERCISES: CPT | Mod: PN,CQ

## 2021-08-26 PROCEDURE — 97530 THERAPEUTIC ACTIVITIES: CPT | Mod: PN

## 2021-09-08 ENCOUNTER — PATIENT MESSAGE (OUTPATIENT)
Dept: REHABILITATION | Facility: HOSPITAL | Age: 71
End: 2021-09-08

## 2021-09-20 ENCOUNTER — CLINICAL SUPPORT (OUTPATIENT)
Dept: REHABILITATION | Facility: HOSPITAL | Age: 71
End: 2021-09-20
Payer: MEDICARE

## 2021-09-20 DIAGNOSIS — R29.898 WEAKNESS OF RIGHT HIP: ICD-10-CM

## 2021-09-20 DIAGNOSIS — R26.9 GAIT ABNORMALITY: ICD-10-CM

## 2021-09-20 PROCEDURE — 97110 THERAPEUTIC EXERCISES: CPT | Mod: PN | Performed by: PHYSICAL THERAPIST

## 2021-09-20 PROCEDURE — 97110 THERAPEUTIC EXERCISES: CPT | Mod: PN,CQ

## 2021-09-20 PROCEDURE — 97530 THERAPEUTIC ACTIVITIES: CPT | Mod: PN | Performed by: PHYSICAL THERAPIST

## 2021-09-28 ENCOUNTER — DOCUMENTATION ONLY (OUTPATIENT)
Dept: REHABILITATION | Facility: HOSPITAL | Age: 71
End: 2021-09-28

## 2021-10-26 ENCOUNTER — DOCUMENTATION ONLY (OUTPATIENT)
Dept: REHABILITATION | Facility: HOSPITAL | Age: 71
End: 2021-10-26
Payer: MEDICARE

## 2021-11-04 ENCOUNTER — DOCUMENTATION ONLY (OUTPATIENT)
Dept: REHABILITATION | Facility: HOSPITAL | Age: 71
End: 2021-11-04
Payer: MEDICARE

## 2021-11-04 PROBLEM — R26.9 GAIT ABNORMALITY: Status: RESOLVED | Noted: 2021-08-17 | Resolved: 2021-11-04

## 2021-11-04 PROBLEM — R29.898 WEAKNESS OF RIGHT HIP: Status: RESOLVED | Noted: 2021-08-17 | Resolved: 2021-11-04

## 2022-02-23 DIAGNOSIS — M70.61 TROCHANTERIC BURSITIS OF RIGHT HIP: Primary | ICD-10-CM

## 2024-03-12 ENCOUNTER — TELEPHONE (OUTPATIENT)
Dept: PRIMARY CARE CLINIC | Facility: CLINIC | Age: 74
End: 2024-03-12
Payer: MEDICARE

## 2024-03-12 NOTE — TELEPHONE ENCOUNTER
----- Message from Sarah Schwartz sent at 3/12/2024  1:23 PM CDT -----  Regarding: Call back  Contact: [phone]  Type:  Sooner Apoointment Request    Caller is requesting a sooner appointment.  Caller declined first available appointment listed below.  Caller will not accept being placed on the waitlist and is requesting a message be sent to doctor.  Name of Caller: New PT   When is the first available appointment? June   Symptoms: Annual   Would the patient rather a call back or a response via [facility]? Call back   Best Call Back Number: [phone]   Additional Information:

## 2024-03-15 ENCOUNTER — OFFICE VISIT (OUTPATIENT)
Dept: PRIMARY CARE CLINIC | Facility: CLINIC | Age: 74
End: 2024-03-15
Payer: MEDICARE

## 2024-03-15 VITALS
HEIGHT: 68 IN | HEART RATE: 64 BPM | WEIGHT: 203.5 LBS | DIASTOLIC BLOOD PRESSURE: 72 MMHG | OXYGEN SATURATION: 98 % | BODY MASS INDEX: 30.84 KG/M2 | SYSTOLIC BLOOD PRESSURE: 118 MMHG

## 2024-03-15 DIAGNOSIS — Z00.00 ANNUAL PHYSICAL EXAM: Primary | ICD-10-CM

## 2024-03-15 DIAGNOSIS — R42 DISEQUILIBRIUM: ICD-10-CM

## 2024-03-15 DIAGNOSIS — F32.1 CURRENT MODERATE EPISODE OF MAJOR DEPRESSIVE DISORDER, UNSPECIFIED WHETHER RECURRENT: ICD-10-CM

## 2024-03-15 DIAGNOSIS — Z87.39 HISTORY OF GOUT: ICD-10-CM

## 2024-03-15 DIAGNOSIS — E03.9 HYPOTHYROIDISM, UNSPECIFIED TYPE: ICD-10-CM

## 2024-03-15 DIAGNOSIS — E66.9 OBESITY, CLASS I, BMI 30-34.9: ICD-10-CM

## 2024-03-15 DIAGNOSIS — Z00.00 HEALTHCARE MAINTENANCE: ICD-10-CM

## 2024-03-15 PROBLEM — E66.811 OBESITY, CLASS I, BMI 30-34.9: Status: ACTIVE | Noted: 2024-03-15

## 2024-03-15 PROCEDURE — 99205 OFFICE O/P NEW HI 60 MIN: CPT | Mod: S$GLB,,, | Performed by: INTERNAL MEDICINE

## 2024-03-15 PROCEDURE — 99999 PR PBB SHADOW E&M-EST. PATIENT-LVL III: CPT | Mod: PBBFAC,,, | Performed by: INTERNAL MEDICINE

## 2024-03-15 RX ORDER — LEVOTHYROXINE SODIUM 100 UG/1
100 TABLET ORAL
COMMUNITY
Start: 2024-02-28

## 2024-03-15 RX ORDER — AMOXICILLIN 500 MG/1
1000 TABLET, FILM COATED ORAL
COMMUNITY

## 2024-03-15 RX ORDER — LEVOCETIRIZINE DIHYDROCHLORIDE 5 MG/1
5 TABLET, FILM COATED ORAL
COMMUNITY
Start: 2024-02-11 | End: 2024-03-15

## 2024-03-15 NOTE — PROGRESS NOTES
Subjective:       Patient ID: Patrick Christy is a 73 y.o. male.    Chief Complaint: Establish Care      HPI  Patrick Christy is a 73 y.o. male with hypothyroidism, depression, gout, were due in the past who presents today for [facility]    Reports he used to follow at [facility] but felt there was not much evaluation and prevention.    His thyroid disease has been controlled with some occasional adjustments of his medication.  Last time his levothyroxine was decreased from 125 mcg to 100 mcg.  Denies palpitations, GI issues, or temperature intolerance.    Notices some imbalance as he sometimes tends to veer to 1 side when walking or occasional loss of balance or mild dizziness. Has chronic loss of hearing and episode of significant vertigo a few years ago. No weakness, numbness, vision changes. Sometimes have fell with no injury with balance issues. This symptoms are not worsened with exercise.     A couple of years ago he gained weight and was able to lose weight on add kin/keto diet.  From 235 went down to 150s and was feeling good.  In the last couple of years has been slowly gaining weight and now over 200.  Recently restarted his diet, low carb, around 2 meals a day.  He exercises every day on a treadmill at a slow-normal pace for about 2 miles a day and some weights.  Reaches 10,000 steps frequently.  He stays active at home.  With this effort has noted lost a few lb and feels better (less sluggish, clear mind, improved dyspepsia).    He has been feeling depressed, which has been present for a long time on an off.  Has not been on treatment as he has been able to cope with music, positive thinking (family, positive news, friends), exercise, and other activities around the house.  Does not like to take medications he does not need to take.  Many years ago try to hurting self driving but felt got guidance and voice that was not his time and things were going to work out.  He is L[employer] day and holds on to his  dieudonne.  Lately has been down because he feels not much motivation or expecting changes in the future, does not see his kids or they look for him, friends have been aging and no longer able to do thinks they used to do, he can feel he is aging too.  Agrees to try to talk to our LCSW.    Patient Health Questionnaire-9 (PHQ-9)      1. Little interest or pleasure in doing things? yes,  Several days                = 1    2. Feeling down, depressed, or hopeless? yes, More than half of days  = 2    3. Trouble falling or staying asleep, or sleeping too much? no, Not at all                       = 0    4. Feeling tired or having little energy? yes, Several days                = 1    5. Poor appetite or overeating? no, Not at all                       = 0    6. Feeling bad about yourself- or that you are a failure or have let yourself or your family down? yes, Nearly every day           = 3    7. Trouble concentrating on things, such as reading the newspaper or watching TV? yes, Nearly every day           = 3    8. Moving or speaking so slowly that other people could have noticed? Or the opposite- being so fidgety or restless that you have been moving around a lot more than usual? no, Not at all                       = 0    9. Thoughts that you would be better off dead or of hurting yourself in some way? yes, Several days                = 1    Total Score: 11     How difficult have these problems made it for you to do your work, take care of things at home, or get along with other people? no, Not at all                       = 0    Scale:   0-4= No intervention  5-9= Recheck next visit and make patient aware of resources  10-14= Call  and consider initiation of antidepressant with MD  15-19= Call  to refer to Psychiatry and start antidepressant  20-27= Call social work and consult Psychiatry      He is , retired. 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 Likes  to exercise, plays different instruments, work on his yard, and learn or expand knowledge.      Health Maintenance:  Health Maintenance   Topic Date Due    Hepatitis C Screening  Never done    Lipid Panel  Never done    Colorectal Cancer Screening  03/03/2025 (Originally 1950)    TETANUS VACCINE  09/01/2032    Shingles Vaccine  Completed   Declines colon cancer screen and checking PSA.    Review of Systems   Constitutional: Negative.  Negative for fever and unexpected weight change.   HENT:  Positive for hearing loss (left ear decreased hearing, chronic).    Respiratory: Negative.     Cardiovascular: Negative.    Gastrointestinal:  Positive for abdominal pain (abdominal discomfort that is improving with changes in diet and weight loss.).   Genitourinary:  Negative for difficulty urinating.   Musculoskeletal: Negative.    Integumentary:  Negative.   Neurological:  Positive for dizziness (in the morning for 1-2 hrs.).        Veers to one side when walking.   Psychiatric/Behavioral:  Positive for dysphoric mood. Negative for self-injury, sleep disturbance and suicidal ideas (but had it in the past).       Past Medical History:   Diagnosis Date    Benign paroxysmal vertigo, bilateral     Depression     Gout, unspecified     Hip osteoarthritis     Hypothyroidism        Past Surgical History:   Procedure Laterality Date    CATARACT EXTRACTION Bilateral     HIP REPLACEMENT ARTHROPLASTY      NASAL SEPTUM SURGERY         Family History   Problem Relation Age of Onset    Osteoarthritis Mother     Lupus Mother     Hyperlipidemia Father     Heart attack Father 51    No Known Problems Sister     Down syndrome Sister     Cancer Brother         Possibly stomach    No Known Problems Brother     Diabetes Maternal Grandmother     Cancer Maternal Grandfather         skin cancer    Sudden death Maternal Grandfather     Cancer Paternal Grandmother         In leg, resected and cured.    Blindness Paternal Grandfather     Dementia  "Paternal Grandfather        Social History     Socioeconomic History    Marital status:    Tobacco Use    Smoking status: Never    Smokeless tobacco: Never   Substance and Sexual Activity    Alcohol use: Not Currently     Alcohol/week: 7.0 standard drinks of alcohol     Types: 3 Glasses of wine, 4 Shots of liquor per week     Comment: once carina    Drug use: Never       Current Outpatient Medications   Medication Sig Dispense Refill    levothyroxine (SYNTHROID) 100 MCG tablet Take 100 mcg by mouth.      amoxicillin (AMOXIL) 500 MG Tab Take 1,000 mg by mouth as needed (procedure).       No current facility-administered medications for this visit.       Review of patient's allergies indicates:   Allergen Reactions    Iodine and iodide containing products Anaphylaxis         Objective:       Last 3 sets of Vitals        7/7/2018    11:32 AM 3/6/2019     1:27 PM 3/15/2024     9:52 AM   Vitals - 1 value per visit   SYSTOLIC 190 152 118   DIASTOLIC 92 92 72   Pulse 71 67 64   Temp  99.2 °F (37.3 °C)    Resp 22 18    SPO2 100 % 97 % 98 %   Weight (lb)  205 203.48   Weight (kg)  92.987 92.3   Height  5' 8" (1.727 m) 5' 8" (1.727 m)   BMI (Calculated)  31.2 30.9   Physical Exam  Constitutional:       General: He is not in acute distress.  HENT:      Head: Normocephalic.      Right Ear: Tympanic membrane, ear canal and external ear normal.      Left Ear: Tympanic membrane, ear canal and external ear normal.      Nose: Nose normal.      Mouth/Throat:      Mouth: Mucous membranes are moist.      Comments: Postnasal drip  Eyes:      General: No scleral icterus.     Extraocular Movements: Extraocular movements intact.      Conjunctiva/sclera: Conjunctivae normal.   Neck:      Vascular: No carotid bruit.      Comments: No goiter.  Cardiovascular:      Rate and Rhythm: Normal rate and regular rhythm.      Pulses: Normal pulses.      Heart sounds: Normal heart sounds.   Pulmonary:      Effort: Pulmonary effort is normal.     "  Breath sounds: Normal breath sounds.   Abdominal:      General: Bowel sounds are normal. There is no distension.      Palpations: Abdomen is soft. There is no mass.      Tenderness: There is no abdominal tenderness.   Musculoskeletal:         General: No swelling.   Lymphadenopathy:      Cervical: No cervical adenopathy.   Skin:     General: Skin is warm and dry.   Neurological:      General: No focal deficit present.      Mental Status: He is alert and oriented to person, place, and time.   Psychiatric:         Behavior: Behavior normal.      Comments: Good eye contact, communicating thoughts with the appropriate affect.           CBC:      CMP:      URINALYSIS:       LIPIDS:      TSH:        A1C:        Imaging:  X-Ray Ankle Complete Right  Narrative: EXAMINATION:  XR ANKLE COMPLETE 3 VIEW RIGHT    CLINICAL HISTORY:  Pain in right ankle and joints of right foot    FINDINGS:  Right: No fracture dislocation bone destruction seen.  There is a spur on the calcaneus.  Impression: See above    Electronically signed by: Vijay Murillo MD  Date:    03/06/2019  Time:    14:09      Assessment:       1. Annual physical exam    2. Hypothyroidism, unspecified type    3. Current moderate episode of major depressive disorder, unspecified whether recurrent    4. Disequilibrium    5. History of gout    6. Obesity, Class I, BMI 30-34.9    7. Healthcare maintenance            Plan:       1. Annual physical exam  -     CBC Auto Differential; Future; Expected date: 03/15/2024  -     Comprehensive Metabolic Panel; Future; Expected date: 03/15/2024  -     Hemoglobin A1C; Future; Expected date: 03/15/2024  -     Lipid Panel; Future; Expected date: 03/15/2024    2. Hypothyroidism, unspecified type  Overview:  On levothroxine 100 mcg daily.    Assessment & Plan:  Clinically euthyroid. Follow labs.    Orders:  -     Lipid Panel; Future; Expected date: 03/15/2024  -     TSH; Future; Expected date: 03/15/2024  -     T4, Free; Future;  Expected date: 03/15/2024    3. Current moderate episode of major depressive disorder, unspecified whether recurrent  Assessment & Plan:  Does not like to take pills. Open to see LCSW. Has been managing depression but may need to consider trial with medication.    Orders:  -     Ambulatory referral/consult to [facility]; Future; Expected date: 03/15/2024    4. Disequilibrium  Overview:  Reports episode of disequilibrium. No loss of balance during visit. Evaluate labs and consider PT, vision, neurology or ENT evaluation if not already done. No focal signs noted other than his hearing loss needing aids.      5. History of gout  Overview:  History of gout attack in the past. No recent event.    Assessment & Plan:  Decrease alcohol intake, red meat, and sea food. Stay hydrated.  Check uric acid levels.    Orders:  -     Uric Acid; Future; Expected date: 03/15/2024    6. Obesity, Class I, BMI 30-34.9  Assessment & Plan:  Working on lifestyle modifications- low carb diet, exercise, planning meals, weight monitoring. Reports has been losing weight.  Check TSH, lipid, metabolic panel.      7. Healthcare maintenance  Assessment & Plan:  - Yearly labs- ordered today.  - Colon cancer screening- declines.  - PSA- declines, no symptoms.  - ACP- no documents available.  - Vaccination- due for COVID booster and RSV shots.         Health Maintenance Due   Topic Date Due    Hepatitis C Screening  Never done    Lipid Panel  Never done    RSV Vaccine (Age 60+ and Pregnant patients) (1 - 1-dose 60+ series) Never done    COVID-19 Vaccine (5 - 2023-24 season) 09/01/2023        This includes face to face time and non-face to face time preparing to see the patient (eg, review of tests), obtaining and/or reviewing separately obtained history, documenting clinical information in the electronic or other health record, independently interpreting results and communicating results to the patient/family/caregiver,  or care coordinator.       Return to clinic in 4 weeks or sooner if needed. I will reach SW.    Deanna Jacobs MD  [facility]  Disclaimer:  This note has been generated using voice-recognition software. There may be grammatical or spelling errors that have been missed during proof-reading

## 2024-03-16 NOTE — ASSESSMENT & PLAN NOTE
Working on lifestyle modifications- low carb diet, exercise, planning meals, weight monitoring. Reports has been losing weight.  Check TSH, lipid, metabolic panel.

## 2024-03-16 NOTE — ASSESSMENT & PLAN NOTE
- Yearly labs- ordered today.  - Colon cancer screening- declines.  - PSA- declines, no symptoms.  - ACP- no documents available.  - Vaccination- due for COVID booster and RSV shots.

## 2024-03-16 NOTE — ASSESSMENT & PLAN NOTE
Does not like to take pills. Open to see LCSW. Has been managing depression but may need to consider trial with medication.

## 2024-03-18 ENCOUNTER — LAB VISIT (OUTPATIENT)
Dept: LAB | Facility: HOSPITAL | Age: 74
End: 2024-03-18
Attending: INTERNAL MEDICINE
Payer: MEDICARE

## 2024-03-18 ENCOUNTER — TELEPHONE (OUTPATIENT)
Dept: PRIMARY CARE CLINIC | Facility: CLINIC | Age: 74
End: 2024-03-18
Payer: MEDICARE

## 2024-03-18 DIAGNOSIS — Z00.00 ANNUAL PHYSICAL EXAM: ICD-10-CM

## 2024-03-18 DIAGNOSIS — Z87.39 HISTORY OF GOUT: ICD-10-CM

## 2024-03-18 DIAGNOSIS — E03.9 HYPOTHYROIDISM, UNSPECIFIED TYPE: ICD-10-CM

## 2024-03-18 LAB
ALBUMIN SERPL BCP-MCNC: 4.1 G/DL (ref 3.5–5.2)
ALP SERPL-CCNC: 70 U/L (ref 55–135)
ALT SERPL W/O P-5'-P-CCNC: 13 U/L (ref 10–44)
ANION GAP SERPL CALC-SCNC: 11 MMOL/L (ref 8–16)
AST SERPL-CCNC: 21 U/L (ref 10–40)
BASOPHILS # BLD AUTO: 0.07 K/UL (ref 0–0.2)
BASOPHILS NFR BLD: 1.4 % (ref 0–1.9)
BILIRUB SERPL-MCNC: 0.5 MG/DL (ref 0.1–1)
BUN SERPL-MCNC: 21 MG/DL (ref 8–23)
CALCIUM SERPL-MCNC: 9.7 MG/DL (ref 8.7–10.5)
CHLORIDE SERPL-SCNC: 109 MMOL/L (ref 95–110)
CHOLEST SERPL-MCNC: 213 MG/DL (ref 120–199)
CHOLEST/HDLC SERPL: 4.8 {RATIO} (ref 2–5)
CO2 SERPL-SCNC: 23 MMOL/L (ref 23–29)
CREAT SERPL-MCNC: 0.9 MG/DL (ref 0.5–1.4)
DIFFERENTIAL METHOD BLD: ABNORMAL
EOSINOPHIL # BLD AUTO: 0.3 K/UL (ref 0–0.5)
EOSINOPHIL NFR BLD: 5.3 % (ref 0–8)
ERYTHROCYTE [DISTWIDTH] IN BLOOD BY AUTOMATED COUNT: 12.9 % (ref 11.5–14.5)
EST. GFR  (NO RACE VARIABLE): >60 ML/MIN/1.73 M^2
ESTIMATED AVG GLUCOSE: 103 MG/DL (ref 68–131)
GLUCOSE SERPL-MCNC: 87 MG/DL (ref 70–110)
HBA1C MFR BLD: 5.2 % (ref 4–5.6)
HCT VFR BLD AUTO: 45.4 % (ref 40–54)
HDLC SERPL-MCNC: 44 MG/DL (ref 40–75)
HDLC SERPL: 20.7 % (ref 20–50)
HGB BLD-MCNC: 15.4 G/DL (ref 14–18)
IMM GRANULOCYTES # BLD AUTO: 0.02 K/UL (ref 0–0.04)
IMM GRANULOCYTES NFR BLD AUTO: 0.4 % (ref 0–0.5)
LDLC SERPL CALC-MCNC: 159.4 MG/DL (ref 63–159)
LYMPHOCYTES # BLD AUTO: 1.8 K/UL (ref 1–4.8)
LYMPHOCYTES NFR BLD: 37 % (ref 18–48)
MCH RBC QN AUTO: 31.4 PG (ref 27–31)
MCHC RBC AUTO-ENTMCNC: 33.9 G/DL (ref 32–36)
MCV RBC AUTO: 93 FL (ref 82–98)
MONOCYTES # BLD AUTO: 0.5 K/UL (ref 0.3–1)
MONOCYTES NFR BLD: 9.7 % (ref 4–15)
NEUTROPHILS # BLD AUTO: 2.3 K/UL (ref 1.8–7.7)
NEUTROPHILS NFR BLD: 46.2 % (ref 38–73)
NONHDLC SERPL-MCNC: 169 MG/DL
NRBC BLD-RTO: 0 /100 WBC
PLATELET # BLD AUTO: 295 K/UL (ref 150–450)
PMV BLD AUTO: 10.1 FL (ref 9.2–12.9)
POTASSIUM SERPL-SCNC: 4.8 MMOL/L (ref 3.5–5.1)
PROT SERPL-MCNC: 7.8 G/DL (ref 6–8.4)
RBC # BLD AUTO: 4.9 M/UL (ref 4.6–6.2)
SODIUM SERPL-SCNC: 143 MMOL/L (ref 136–145)
T4 FREE SERPL-MCNC: 1.09 NG/DL (ref 0.71–1.51)
TRIGL SERPL-MCNC: 48 MG/DL (ref 30–150)
TSH SERPL DL<=0.005 MIU/L-ACNC: 1.77 UIU/ML (ref 0.4–4)
URATE SERPL-MCNC: 7 MG/DL (ref 3.4–7)
WBC # BLD AUTO: 4.95 K/UL (ref 3.9–12.7)

## 2024-03-18 PROCEDURE — 84443 ASSAY THYROID STIM HORMONE: CPT | Performed by: INTERNAL MEDICINE

## 2024-03-18 PROCEDURE — 36415 COLL VENOUS BLD VENIPUNCTURE: CPT | Performed by: INTERNAL MEDICINE

## 2024-03-18 PROCEDURE — 80061 LIPID PANEL: CPT | Performed by: INTERNAL MEDICINE

## 2024-03-18 PROCEDURE — 84550 ASSAY OF BLOOD/URIC ACID: CPT | Performed by: INTERNAL MEDICINE

## 2024-03-18 PROCEDURE — 83036 HEMOGLOBIN GLYCOSYLATED A1C: CPT | Performed by: INTERNAL MEDICINE

## 2024-03-18 PROCEDURE — 80053 COMPREHEN METABOLIC PANEL: CPT | Performed by: INTERNAL MEDICINE

## 2024-03-18 PROCEDURE — 85025 COMPLETE CBC W/AUTO DIFF WBC: CPT | Performed by: INTERNAL MEDICINE

## 2024-03-18 PROCEDURE — 84439 ASSAY OF FREE THYROXINE: CPT | Performed by: INTERNAL MEDICINE

## 2024-03-18 NOTE — TELEPHONE ENCOUNTER
LCSW received referral from Dr. Jacobs.  LCSW called patient and home number answered as a fax machine.  LCSW left voicemail on cell number requesting a return call to discuss referral.

## 2024-03-20 ENCOUNTER — PATIENT MESSAGE (OUTPATIENT)
Dept: PRIMARY CARE CLINIC | Facility: CLINIC | Age: 74
End: 2024-03-20
Payer: MEDICARE

## 2024-03-26 ENCOUNTER — TELEPHONE (OUTPATIENT)
Dept: PRIMARY CARE CLINIC | Facility: CLINIC | Age: 74
End: 2024-03-26
Payer: MEDICARE

## 2024-03-26 ENCOUNTER — PATIENT MESSAGE (OUTPATIENT)
Dept: PRIMARY CARE CLINIC | Facility: CLINIC | Age: 74
End: 2024-03-26
Payer: MEDICARE

## 2024-03-26 NOTE — TELEPHONE ENCOUNTER
LCSW received return call from patient regarding referral from Dr. Jacobs for behavioral health services.  LCSW explained the role of therapist.  Patient verbalized understanding of service provided and was in agreement to schedule an initial appointment. Patient is scheduled 4/4 at 1pm for intake.  Patient was encouraged to contact LCSW with any need to reschedule.

## 2024-03-27 ENCOUNTER — OFFICE VISIT (OUTPATIENT)
Dept: PRIMARY CARE CLINIC | Facility: CLINIC | Age: 74
End: 2024-03-27
Payer: MEDICARE

## 2024-03-27 DIAGNOSIS — E78.5 HYPERLIPIDEMIA, UNSPECIFIED HYPERLIPIDEMIA TYPE: ICD-10-CM

## 2024-03-27 DIAGNOSIS — Z87.39 HISTORY OF GOUT: ICD-10-CM

## 2024-03-27 DIAGNOSIS — J06.9 VIRAL URI: Primary | ICD-10-CM

## 2024-03-27 PROCEDURE — 99442 PR PHYSICIAN TELEPHONE EVALUATION 11-20 MIN: CPT | Mod: 95,,, | Performed by: INTERNAL MEDICINE

## 2024-03-27 NOTE — PROGRESS NOTES
Established Patient - Audio Only Telehealth Visit     The patient location is: Home  The chief complaint leading to consultation is: Cough, chest congestion  Visit type: Virtual visit with audio only (telephone)  Total time spent with patient: 12       The reason for the audio only service rather than synchronous audio and video virtual visit was related to technical difficulties or patient preference/necessity.     Each patient to whom I provide medical services by telemedicine is:  (1) informed of the relationship between the physician and patient and the respective role of any other health care provider with respect to management of the patient; and (2) notified that they may decline to receive medical services by telemedicine and may withdraw from such care at any time. Patient verbally consented to receive this service via voice-only telephone call.       HPI: Patrick Christy is a 73 y.o. male with hypothyroidism,  hyperlipidemia, depression, gout who reports in the last 2-3 days has been having cold like symptoms. Has been having nasal congestion with stuffy nose that has  to breathe with his mouth open when he sleeps. Has been noticing chest congestion and wheezing that concern him.  Sleeps better if elevates his head instead of flat. Congestion  was making him hard to breathe and started expectorants every 5 hours and symptoms have been improving.  The cough is mostly dry and congestion seems to be on his chest.  Pulse oximetry has been 100%.    Also reports headaches that are relieved with ibuprofen. Had COVID test is morning which was negative.     He is going to continue night and day cold and flu medications which feels they have been helping.  There has been no fever and nobody close to her with similar symptoms.  Denies leg swelling.   Does not feel he needs further cough medication.     Has no toxic habits.     Patient sounds alert, oriented,  strong voice, in no distress.     Reviewed labs and reports  he is working on improving his cholesterol and uric acid conservatively.     Assessment and plan:  Diagnoses and all orders for this visit:    Viral URI  Comments:  Suspect URI due to virus.  symptoms improving with symptomatic treatment.  Encourage fluids, okay for cold and flu medication,  add Flonase.  No CXR need.    Hyperlipidemia, unspecified hyperlipidemia type  Comments:  Wants to manage conservatively.  Continue to monitor.    History of gout  Comments:  Borderline uric acid and adjusting diet to optimize it.          Advised to call us back if conditions worsens, fever,  or any concerns.                     This service was not originating from a related E/M service provided within the previous 7 days nor will  to an E/M service or procedure within the next 24 hours or my soonest available appointment.  Prevailing standard of care was able to be met in this audio-only visit.

## 2024-04-04 ENCOUNTER — PATIENT MESSAGE (OUTPATIENT)
Dept: PRIMARY CARE CLINIC | Facility: CLINIC | Age: 74
End: 2024-04-04

## 2024-04-04 ENCOUNTER — CLINICAL SUPPORT (OUTPATIENT)
Dept: PRIMARY CARE CLINIC | Facility: CLINIC | Age: 74
End: 2024-04-04
Payer: MEDICARE

## 2024-04-04 DIAGNOSIS — F32.1 CURRENT MODERATE EPISODE OF MAJOR DEPRESSIVE DISORDER, UNSPECIFIED WHETHER RECURRENT: ICD-10-CM

## 2024-04-04 PROCEDURE — 99499 UNLISTED E&M SERVICE: CPT | Mod: S$GLB,,,

## 2024-04-04 NOTE — PROGRESS NOTES
"  [facility] BEHAVIORAL HEALTH INTAKE    DATE:  4/10/2024  REFERRAL SOURCE:  Deanna Jacobs MD  TYPE OF VISIT:  In person  LENGTH OF SESSION: 60  .  HISTORY OF PRESENTING ILLNESS:  Patrick Christy, a 73 y.o. male with history of MAJOR DEPRESSIVE DISORDER, SINGLE EPISODE, Moderate (F32.1).    CHIEF COMPLAINT/REASON FOR ENCOUNTER: Pt presented for initial assessment. Met with patient. Pt's chief complaint includes the following: depression: fatigue, feeling down, "lack of desire/initiation/motivation"    Patient does not currently have a psychiatrist.    Patient does not currently have a therapist.     No Psychiatric Medications. They are not interested in medication changes.    Current symptoms:  Depression: hopelessness and fatigue.  Anxiety: irritable  Insomnia:  denies .  Анна:  denies.  Psychosis: denies .      Session Content/Presenting Problem Hx:  Patient verbalized that he has had a lack of desire or motivation to do things which is not his norm.  He retired from [employer] several years ago and misses the conversations and "learning by listening to others." He verbalized that his close friends "are too busy" when he and wife try to get together for dinner, etc.  He and his wife used to entertain at their home a lot but stopped appx 1.5 yrs ago due to all the work involved as they are aging.  "I have always been a social person." Patient verbalized he feels like he is letting himself down by not taking care of himself physically as he used to by running.  He does walk every day 2 mi/day on a treadmill at home.  He enjoys being in the sunshine, music and taking his dogs to the park but is not doing this as often.  He maintains his home and garden including lawn and enjoys this.  Patient shared that he bought himself and his wife each a new car for [place] and that his just sits in the garage.  He used to "jump in the car and take a drive" to enjoy all the new technology cars have now.  He hasn's been doing this.  He " "wants to find something of value to him.  Patient shared near the end of the session that he imagined "this session being a support group like he has seen on TV."   He explained that he likes to learn from listening to others and thinks finding activities that he can use his listening skills would be motivating.  Explored volunteer options as he mentioned interest in sitting and talking with elderly/disabled that are lacking social support.  Educated patient on [employer] and provided application.  Educated patient on Health  service at 65+.  Patient initiated the idea of a possible coffee and conversation activity here in the activity room for patients.        Current social stressors:   Smaller social network    Risk assessment:  Patient reports no suicidal ideation  Patient reports no homicidal ideation  Patient reports no self-injurious behavior  Patient reports no violent behavior    PSYCHIATRIC HISTORY:  History of Анна or diagnosis of Bipolar Disorder in the past:  No  History of Psychosis or diagnosis of Schizophrenia in the past:  No  Previous Psychiatric Hospitalizations:  No  Previous SI/HI:   No  Previous Suicide Attempts:  No  Previous Medication Trials: No   Previous Psychiatric Outpatient Treatment:  yes - saw a psychiatrist many years ago and helped me with medication.  Doesn't recall diagnosis, medication or how long was on medication  History of Trauma:  No  History of Violence:  No  Access to a Gun:  No    SUBSTANCE ABUSE HISTORY:  Tobacco:  No   Alcohol: occasional - 1-2x/week  Illicit Substances: No  Misuse of Prescription Medications:  No    MEDICAL HISTORY:  Past Medical History:   Diagnosis Date    Benign paroxysmal vertigo, bilateral     Depression     Gout, unspecified     Hip osteoarthritis     Hypothyroidism        NEUROLOGIC HISTORY:  Seizures:  No  Head trauma:  No  Memory loss:  No    SOCIAL HISTORY (MARRIAGE, EMPLOYMENT, etc.):  Living Situation: lives with 2nd wife in " "their own home.  June (19yo) is staying with them while she is in college and working.  They have 2 dogs.  Relationship Status: previously  x 10 yrs very young.   to 2nd wife x 41yrs  Children/Family: have 3 children: son is 53, dtrs are 42 and 40.  They have 7 grandchildren.   Supports:God, family  Education/Vocation: Born in [place] and moved here in 1960.  did not finish high school.  Has been a self-taught life-long learner.  Worked in [employer] at [employer] until 2007.  Mormonism/Spirituality: "I believe in J[employer] with all of my heart"  Hobbies and Interests: computers, learning/reading/podcasts    PSYCHIATRIC FAMILY HISTORY: none      MENTAL HEALTH STATUS EXAM  General Appearance:  unremarkable, age appropriate   Speech: normal tone, normal rate, normal pitch, normal volume      Level of Cooperation: cooperative      Thought Processes: normal and logical   Mood: steady      Thought Content: normal, no suicidality, no homicidality, delusions, or paranoia   Affect: congruent and appropriate   Orientation: Oriented x3   Memory: intact   Attention Span & Concentration: intact   Fund of General Knowledge: intact and appropriate to age and level of education   Abstract Reasoning: Appropriate within session   Judgment & Insight: good     Language  intact       IMPRESSION:   My diagnostic impression is MAJOR DEPRESSIVE DISORDER, SINGLE EPISODE, Moderate (F32.1), as evidenced by PHQ-9 (score of 15 today) and self-report.     PROVISIONAL DIAGNOSES:  1. Current moderate episode of major depressive disorder, unspecified whether recurrent         STRENGTHS AND LIABILITIES: Strength: Patient is expressive/articulate., Strength: Patient is intelligent., Strength: Patient is stable.    TREATMENT GOALS: Depression: increasing interest in usual activities    PLAN: This is patient's initial session.  The majority of this session was focused on rapport-building and assessing patient's areas of clinical concern. " Will focus on goal setting at next session.  Problem-solving Therapy will be utilized in future individual  therapy sessions to increase interaction and support.  Patient would like to come back in 1 mo after exploring volunteer and socialization activities available.      RETURN TO CLINIC: 1 month 5.06.24

## 2024-04-18 ENCOUNTER — HOSPITAL ENCOUNTER (OUTPATIENT)
Dept: RADIOLOGY | Facility: HOSPITAL | Age: 74
Discharge: HOME | End: 2024-04-18
Attending: INTERNAL MEDICINE
Payer: MEDICARE

## 2024-04-18 ENCOUNTER — OFFICE VISIT (OUTPATIENT)
Dept: PRIMARY CARE CLINIC | Facility: CLINIC | Age: 74
End: 2024-04-18
Payer: MEDICARE

## 2024-04-18 VITALS
BODY MASS INDEX: 29.47 KG/M2 | WEIGHT: 194.44 LBS | DIASTOLIC BLOOD PRESSURE: 78 MMHG | SYSTOLIC BLOOD PRESSURE: 121 MMHG | HEART RATE: 70 BPM | HEIGHT: 68 IN | OXYGEN SATURATION: 96 %

## 2024-04-18 DIAGNOSIS — J20.9 ACUTE BRONCHITIS, UNSPECIFIED ORGANISM: Primary | ICD-10-CM

## 2024-04-18 DIAGNOSIS — F32.1 CURRENT MODERATE EPISODE OF MAJOR DEPRESSIVE DISORDER, UNSPECIFIED WHETHER RECURRENT: ICD-10-CM

## 2024-04-18 DIAGNOSIS — E03.9 HYPOTHYROIDISM, UNSPECIFIED TYPE: ICD-10-CM

## 2024-04-18 DIAGNOSIS — Z00.00 HEALTHCARE MAINTENANCE: ICD-10-CM

## 2024-04-18 DIAGNOSIS — J20.9 ACUTE BRONCHITIS, UNSPECIFIED ORGANISM: ICD-10-CM

## 2024-04-18 DIAGNOSIS — Z87.39 HISTORY OF GOUT: ICD-10-CM

## 2024-04-18 PROCEDURE — 99214 OFFICE O/P EST MOD 30 MIN: CPT | Mod: 25,S$GLB,, | Performed by: INTERNAL MEDICINE

## 2024-04-18 PROCEDURE — 1126F AMNT PAIN NOTED NONE PRSNT: CPT | Mod: CPTII,S$GLB,, | Performed by: INTERNAL MEDICINE

## 2024-04-18 PROCEDURE — 1101F PT FALLS ASSESS-DOCD LE1/YR: CPT | Mod: CPTII,S$GLB,, | Performed by: INTERNAL MEDICINE

## 2024-04-18 PROCEDURE — 1160F RVW MEDS BY RX/DR IN RCRD: CPT | Mod: CPTII,S$GLB,, | Performed by: INTERNAL MEDICINE

## 2024-04-18 PROCEDURE — 3074F SYST BP LT 130 MM HG: CPT | Mod: CPTII,S$GLB,, | Performed by: INTERNAL MEDICINE

## 2024-04-18 PROCEDURE — 99999 PR PBB SHADOW E&M-EST. PATIENT-LVL III: CPT | Mod: PBBFAC,,, | Performed by: INTERNAL MEDICINE

## 2024-04-18 PROCEDURE — 3008F BODY MASS INDEX DOCD: CPT | Mod: CPTII,S$GLB,, | Performed by: INTERNAL MEDICINE

## 2024-04-18 PROCEDURE — 96372 THER/PROPH/DIAG INJ SC/IM: CPT | Mod: S$GLB,,, | Performed by: INTERNAL MEDICINE

## 2024-04-18 PROCEDURE — 3078F DIAST BP <80 MM HG: CPT | Mod: CPTII,S$GLB,, | Performed by: INTERNAL MEDICINE

## 2024-04-18 PROCEDURE — 1159F MED LIST DOCD IN RCRD: CPT | Mod: CPTII,S$GLB,, | Performed by: INTERNAL MEDICINE

## 2024-04-18 PROCEDURE — 3288F FALL RISK ASSESSMENT DOCD: CPT | Mod: CPTII,S$GLB,, | Performed by: INTERNAL MEDICINE

## 2024-04-18 PROCEDURE — 71046 X-RAY EXAM CHEST 2 VIEWS: CPT | Mod: TC,FY

## 2024-04-18 PROCEDURE — 3044F HG A1C LEVEL LT 7.0%: CPT | Mod: CPTII,S$GLB,, | Performed by: INTERNAL MEDICINE

## 2024-04-18 PROCEDURE — 71046 X-RAY EXAM CHEST 2 VIEWS: CPT | Mod: 26,,, | Performed by: STUDENT IN AN ORGANIZED HEALTH CARE EDUCATION/TRAINING PROGRAM

## 2024-04-18 RX ORDER — TRIAMCINOLONE ACETONIDE 40 MG/ML
40 INJECTION, SUSPENSION INTRA-ARTICULAR; INTRAMUSCULAR ONCE
Status: COMPLETED | OUTPATIENT
Start: 2024-04-18 | End: 2024-04-18

## 2024-04-18 RX ORDER — AMOXICILLIN AND CLAVULANATE POTASSIUM 875; 125 MG/1; MG/1
1 TABLET, FILM COATED ORAL 2 TIMES DAILY
Qty: 14 TABLET | Refills: 0 | Status: SHIPPED | OUTPATIENT
Start: 2024-04-18 | End: 2024-04-25

## 2024-04-18 RX ORDER — GUAIFENESIN AND DEXTROMETHORPHAN HYDROBROMIDE 1200; 60 MG/1; MG/1
1 TABLET, EXTENDED RELEASE ORAL 2 TIMES DAILY
Qty: 20 TABLET | Refills: 0 | Status: SHIPPED | OUTPATIENT
Start: 2024-04-18 | End: 2024-04-28

## 2024-04-18 RX ORDER — FLUTICASONE PROPIONATE 50 MCG
2 SPRAY, SUSPENSION (ML) NASAL DAILY
Qty: 18 G | Refills: 0 | Status: SHIPPED | OUTPATIENT
Start: 2024-04-18

## 2024-04-18 RX ADMIN — TRIAMCINOLONE ACETONIDE 40 MG: 40 INJECTION, SUSPENSION INTRA-ARTICULAR; INTRAMUSCULAR at 11:04

## 2024-04-18 NOTE — PROGRESS NOTES
Subjective:       Patient ID: Patrick Christy is a 73 y.o. male.    Chief Complaint: Follow-up      HPI  Patrick Christy is a 73 y.o. male with hypothyroidism, depression, gout  who presents today for Follow-up    Reports he has feeling a little better but still feels tired, coughing, and occasional wheezing and chest congestion.  Feel free bridge when symptoms were starting.  Has been taking medications for his symptoms including Robitussin for cold and flu, Flonase, lateral supplements.  Sometimes feels short of breath with some tightness and congestion.  Has used occasionally inhaler.    Feels very fatigued.  Is 3 times a day, sleeps good, stays active but gets tired easily.  Keeps a high-protein diet..    Blood pressure tends to be elevated at the doctor's office but checks at home and his blood pressure was 121/78.  Denies headaches or palpitations.  Lost some weight with diet and was exercising before.    Has no toxic habits.  Mood has been stable.    Health Maintenance:  Health Maintenance   Topic Date Due    Hepatitis C Screening  Never done    Colorectal Cancer Screening  03/03/2025 (Originally 7/31/1996)    Lipid Panel  03/18/2029    TETANUS VACCINE  09/01/2032    Shingles Vaccine  Completed       Review of Systems   Constitutional:  Positive for fatigue. Negative for unexpected weight change. Fever: feverish.  HENT:  Positive for postnasal drip and sinus pressure/congestion.    Respiratory:  Positive for cough, shortness of breath and wheezing.    Cardiovascular: Negative.    Gastrointestinal: Negative.    Genitourinary:  Negative for difficulty urinating.   Musculoskeletal: Negative.    Integumentary:  Negative.   Neurological: Negative.    Psychiatric/Behavioral: Negative.        Past Medical History:   Diagnosis Date    Benign paroxysmal vertigo, bilateral     Depression     Gout, unspecified     Hip osteoarthritis     Hypothyroidism        Past Surgical History:   Procedure Laterality Date    CATARACT  EXTRACTION Bilateral     HIP REPLACEMENT ARTHROPLASTY      NASAL SEPTUM SURGERY         Family History   Problem Relation Name Age of Onset    Osteoarthritis Mother      Lupus Mother      Hyperlipidemia Father      Heart attack Father  51    No Known Problems Sister 2     Down syndrome Sister 1     Cancer Brother 1         Possibly stomach    No Known Problems Brother      Diabetes Maternal Grandmother      Cancer Maternal Grandfather          skin cancer    Sudden death Maternal Grandfather      Cancer Paternal Grandmother          In leg, resected and cured.    Blindness Paternal Grandfather      Dementia Paternal Grandfather         Social History     Socioeconomic History    Marital status:    Tobacco Use    Smoking status: Never    Smokeless tobacco: Never   Substance and Sexual Activity    Alcohol use: Not Currently     Alcohol/week: 7.0 standard drinks of alcohol     Types: 3 Glasses of wine, 4 Shots of liquor per week     Comment: once dalily    Drug use: Never       Current Outpatient Medications   Medication Sig Dispense Refill    levothyroxine (SYNTHROID) 100 MCG tablet Take 100 mcg by mouth.      amoxicillin (AMOXIL) 500 MG Tab Take 1,000 mg by mouth as needed (procedure). (Patient not taking: Reported on 4/18/2024)      amoxicillin-clavulanate 875-125mg (AUGMENTIN) 875-125 mg per tablet Take 1 tablet by mouth 2 (two) times daily. for 7 days 14 tablet 0    dextromethorphan-guaiFENesin (MUCINEX DM) 60-1,200 mg per 12 hr tablet Take 1 tablet by mouth 2 (two) times a day. for 10 days 20 tablet 0    fluticasone propionate (FLONASE) 50 mcg/actuation nasal spray 2 sprays (100 mcg total) by Each Nostril route once daily. 18 g 0     No current facility-administered medications for this visit.       Review of patient's allergies indicates:   Allergen Reactions    Iodine and iodide containing products Anaphylaxis         Objective:       Last 3 sets of Vitals        3/6/2019     1:27 PM 3/15/2024     9:52  "AM 4/18/2024    10:55 AM   Vitals - 1 value per visit   SYSTOLIC 152 118 150   DIASTOLIC 92 72 76   Pulse 67 64 70   Temp 99.2 °F (37.3 °C)     Resp 18     SPO2 97 % 98 % 96 %   Weight (lb) 205 203.48 194.45   Weight (kg) 92.987 92.3 88.2   Height 5' 8" (1.727 m) 5' 8" (1.727 m) 5' 8" (1.727 m)   BMI (Calculated) 31.2 30.9 29.6   Pain Score   Zero   Physical Exam  Constitutional:       General: He is not in acute distress.  HENT:      Head: Normocephalic.      Right Ear: Tympanic membrane, ear canal and external ear normal.      Left Ear: Tympanic membrane, ear canal and external ear normal.      Nose: Nose normal.      Mouth/Throat:      Mouth: Mucous membranes are moist.      Pharynx: No oropharyngeal exudate.      Comments: Postnasal drip  Eyes:      General: No scleral icterus.     Extraocular Movements: Extraocular movements intact.      Conjunctiva/sclera: Conjunctivae normal.   Neck:      Vascular: No carotid bruit.      Comments: No goiter.  Cardiovascular:      Rate and Rhythm: Normal rate and regular rhythm.      Pulses: Normal pulses.      Heart sounds: Normal heart sounds.   Pulmonary:      Effort: Pulmonary effort is normal.      Breath sounds: Rhonchi present. No wheezing.   Abdominal:      General: Bowel sounds are normal. There is no distension.      Palpations: Abdomen is soft. There is no mass.      Tenderness: There is no abdominal tenderness.   Musculoskeletal:         General: No swelling.   Lymphadenopathy:      Cervical: No cervical adenopathy.   Skin:     General: Skin is warm and dry.   Neurological:      General: No focal deficit present.      Mental Status: He is alert and oriented to person, place, and time.   Psychiatric:         Mood and Affect: Mood normal.         Behavior: Behavior normal.           CBC:  Recent Labs   Lab 03/18/24  0816   WBC 4.95   RBC 4.90   Hemoglobin 15.4   Hematocrit 45.4   Platelets 295   MCV 93   MCH 31.4 H   MCHC 33.9     CMP:  Recent Labs   Lab " 03/18/24  0816   Glucose 87   Calcium 9.7   Albumin 4.1   Total Protein 7.8   Sodium 143   Potassium 4.8   CO2 23   Chloride 109   BUN 21   Creatinine 0.9   Alkaline Phosphatase 70   ALT 13   AST 21   Total Bilirubin 0.5     URINALYSIS:       LIPIDS:  Recent Labs   Lab 03/18/24  0816   TSH 1.772   HDL 44   Cholesterol 213 H   Triglycerides 48   LDL Cholesterol 159.4 H   HDL/Cholesterol Ratio 20.7   Non-HDL Cholesterol 169   Total Cholesterol/HDL Ratio 4.8     TSH:  Recent Labs   Lab 03/18/24  0816   TSH 1.772       A1C:  Recent Labs   Lab 03/18/24  0816   Hemoglobin A1C 5.2       Imaging:  X-Ray Chest PA And Lateral  Narrative: EXAMINATION:  XR CHEST PA AND LATERAL    CLINICAL HISTORY:  Acute bronchitis, unspecified    TECHNIQUE:  PA and lateral views of the chest were performed.    COMPARISON:  Chest radiograph 07/07/2018    FINDINGS:  The lungs are clear, with normal appearance of pulmonary vasculature and no pleural effusion or pneumothorax.    The cardiac silhouette is unchanged in size. The hilar and mediastinal contours are unremarkable.    Bones are intact.  Degenerative change.  Impression: No acute intrathoracic abnormality.    Electronically signed by: Carlos Wade  Date:    04/18/2024  Time:    12:58      Assessment:       1. Acute bronchitis, unspecified organism    2. Hypothyroidism, unspecified type    3. History of gout    4. Current moderate episode of major depressive disorder, unspecified whether recurrent    5. Healthcare maintenance            Plan:       1. Acute bronchitis, unspecified organism  -     X-Ray Chest PA And Lateral; Future; Expected date: 04/18/2024  -     triamcinolone acetonide injection 40 mg  -     fluticasone propionate (FLONASE) 50 mcg/actuation nasal spray; 2 sprays (100 mcg total) by Each Nostril route once daily.  Dispense: 18 g; Refill: 0  -     dextromethorphan-guaiFENesin (MUCINEX DM) 60-1,200 mg per 12 hr tablet; Take 1 tablet by mouth 2 (two) times a day. for 10 days   Dispense: 20 tablet; Refill: 0  -     amoxicillin-clavulanate 875-125mg (AUGMENTIN) 875-125 mg per tablet; Take 1 tablet by mouth 2 (two) times daily. for 7 days  Dispense: 14 tablet; Refill: 0    2. Hypothyroidism, unspecified type  Overview:  On levothroxine 100 mcg daily.    Assessment & Plan:  Clinically euthyroid. Follow labs.      3. History of gout  Overview:  History of gout attack in the past. No recent event.    Assessment & Plan:    Borderline uric acid and adjusting diet to optimize it.      4. Current moderate episode of major depressive disorder, unspecified whether recurrent  Assessment & Plan:  Met with LCSW.  Reports stable mood.  Continue to monitor.      5. Healthcare maintenance  Assessment & Plan:  - Yearly labs- done on 03/18/2024.  - Colon cancer screening- declines.  - PSA- declines, no symptoms.  - ACP- no documents available.  - Vaccination- due for COVID booster and RSV shots.         Health Maintenance Due   Topic Date Due    Hepatitis C Screening  Never done    RSV Vaccine (Age 60+ and Pregnant patients) (1 - 1-dose 60+ series) Never done    COVID-19 Vaccine (5 - 2023-24 season) 09/01/2023        I spent a total of 40 minutes on the day of the visit.This includes face to face time and non-face to face time preparing to see the patient (eg, review of tests), obtaining and/or reviewing separately obtained history, documenting clinical information in the electronic or other health record, independently interpreting results and communicating results to the patient/family/caregiver, or care coordinator.       Return to clinic in 3 months but earlier if needed  WS 2    Deanna Jacobs MD  [facility]  Disclaimer:  This note has been generated using voice-recognition software. There may be grammatical or spelling errors that have been missed during proof-reading

## 2024-04-19 ENCOUNTER — PATIENT MESSAGE (OUTPATIENT)
Dept: PRIMARY CARE CLINIC | Facility: CLINIC | Age: 74
End: 2024-04-19
Payer: MEDICARE

## 2024-04-19 NOTE — ASSESSMENT & PLAN NOTE
- Yearly labs- done on 03/18/2024.  - Colon cancer screening- declines.  - PSA- declines, no symptoms.  - ACP- no documents available.  - Vaccination- due for COVID booster and RSV shots.

## 2024-05-28 DIAGNOSIS — Z00.00 ENCOUNTER FOR MEDICARE ANNUAL WELLNESS EXAM: ICD-10-CM

## 2024-06-17 PROBLEM — Z00.00 HEALTHCARE MAINTENANCE: Status: RESOLVED | Noted: 2024-03-15 | Resolved: 2024-06-17

## 2024-06-18 ENCOUNTER — PATIENT MESSAGE (OUTPATIENT)
Dept: PRIMARY CARE CLINIC | Facility: CLINIC | Age: 74
End: 2024-06-18
Payer: MEDICARE

## 2024-06-18 RX ORDER — ALLOPURINOL 100 MG/1
100 TABLET ORAL DAILY
Qty: 90 TABLET | Refills: 2 | Status: SHIPPED | OUTPATIENT
Start: 2024-06-18

## 2024-06-19 RX ORDER — COLCHICINE 0.6 MG/1
0.6 TABLET ORAL 2 TIMES DAILY PRN
Qty: 30 TABLET | Refills: 1 | Status: SHIPPED | OUTPATIENT
Start: 2024-06-19 | End: 2025-06-19

## 2024-07-18 ENCOUNTER — OFFICE VISIT (OUTPATIENT)
Dept: PRIMARY CARE CLINIC | Facility: CLINIC | Age: 74
End: 2024-07-18
Payer: MEDICARE

## 2024-07-18 VITALS
HEIGHT: 68 IN | BODY MASS INDEX: 26.71 KG/M2 | HEART RATE: 67 BPM | WEIGHT: 176.25 LBS | OXYGEN SATURATION: 97 % | DIASTOLIC BLOOD PRESSURE: 86 MMHG | SYSTOLIC BLOOD PRESSURE: 134 MMHG

## 2024-07-18 DIAGNOSIS — E78.49 OTHER HYPERLIPIDEMIA: ICD-10-CM

## 2024-07-18 DIAGNOSIS — R79.9 ABNORMAL BLOOD CHEMISTRY: ICD-10-CM

## 2024-07-18 DIAGNOSIS — Z00.00 HEALTHCARE MAINTENANCE: ICD-10-CM

## 2024-07-18 DIAGNOSIS — E03.9 HYPOTHYROIDISM, UNSPECIFIED TYPE: Primary | ICD-10-CM

## 2024-07-18 DIAGNOSIS — Z87.39 HISTORY OF GOUT: ICD-10-CM

## 2024-07-18 PROCEDURE — 1126F AMNT PAIN NOTED NONE PRSNT: CPT | Mod: CPTII,S$GLB,, | Performed by: INTERNAL MEDICINE

## 2024-07-18 PROCEDURE — 1101F PT FALLS ASSESS-DOCD LE1/YR: CPT | Mod: CPTII,S$GLB,, | Performed by: INTERNAL MEDICINE

## 2024-07-18 PROCEDURE — 3079F DIAST BP 80-89 MM HG: CPT | Mod: CPTII,S$GLB,, | Performed by: INTERNAL MEDICINE

## 2024-07-18 PROCEDURE — 3044F HG A1C LEVEL LT 7.0%: CPT | Mod: CPTII,S$GLB,, | Performed by: INTERNAL MEDICINE

## 2024-07-18 PROCEDURE — 99999 PR PBB SHADOW E&M-EST. PATIENT-LVL III: CPT | Mod: PBBFAC,,, | Performed by: INTERNAL MEDICINE

## 2024-07-18 PROCEDURE — 1159F MED LIST DOCD IN RCRD: CPT | Mod: CPTII,S$GLB,, | Performed by: INTERNAL MEDICINE

## 2024-07-18 PROCEDURE — 3288F FALL RISK ASSESSMENT DOCD: CPT | Mod: CPTII,S$GLB,, | Performed by: INTERNAL MEDICINE

## 2024-07-18 PROCEDURE — 99214 OFFICE O/P EST MOD 30 MIN: CPT | Mod: S$GLB,,, | Performed by: INTERNAL MEDICINE

## 2024-07-18 PROCEDURE — 1160F RVW MEDS BY RX/DR IN RCRD: CPT | Mod: CPTII,S$GLB,, | Performed by: INTERNAL MEDICINE

## 2024-07-18 PROCEDURE — 3008F BODY MASS INDEX DOCD: CPT | Mod: CPTII,S$GLB,, | Performed by: INTERNAL MEDICINE

## 2024-07-18 PROCEDURE — 1158F ADVNC CARE PLAN TLK DOCD: CPT | Mod: CPTII,S$GLB,, | Performed by: INTERNAL MEDICINE

## 2024-07-18 PROCEDURE — 3075F SYST BP GE 130 - 139MM HG: CPT | Mod: CPTII,S$GLB,, | Performed by: INTERNAL MEDICINE

## 2024-07-18 NOTE — PROGRESS NOTES
Subjective:       Patient ID: Patrick Christy is a 73 y.o. male.    Chief Complaint: Hyperlipidemia      HPI  Patrick Christy is a 73 y.o. male with hypothyroidism, hyperlipidemia, depression, gout  who presents today for Hyperlipidemia    Reports he feels well.  Had episodes of improved with allopurinol.  Early has a episodes of gout and the medication seems to help as needed.  His last uric acid was 7 without allopurinol.  Currently without pain or discomfort.    Previous labs with elevated LDL and total cholesterol that prefers to treat conservatively with diet and exercise.    His last TSH and free T4 were normal.  Compliant with medication. Lost around 27 lbs intentionally with regular exercise and watching his diet.    Keeps his diet low carb and portion control, includes lean meats and steak during weekends.  Has no toxic habits.  Mood has been stable.    Monitors his blood pressure at home and has been with systolic blood pressures in the 120-130s and diastolic in the 70s and sometimes in the 80s.  Denies headaches, chest pains, or exertional symptoms of concern.    Advance Care Planning     Date: 07/19/2024    Power of   I initiated the process of voluntary advance care planning today and explained the importance of this process to the patient.  I introduced the concept of advance directives to the patient, as well. Then the patient received detailed information about the importance of designating a Health Care Power of  (HCPOA). He was also instructed to communicate with this person about their wishes for future healthcare, should he become sick and lose decision-making capacity. The patient has not previously appointed a HCPOA. After our discussion, the patient has decided to complete a HCPOA and has appointed his  siblings , health care agent:  Annelise Munguia and Teena Pate  & health care agent number:  [phone], [phone] . I encouraged him to communicate with this person about  their wishes for future healthcare, should he become sick and lose decision-making capacity.      A total of 5 min was spent on advance care planning. This discussion occurred on a fully voluntary basis with the verbal consent of the patient and/or family.    Advance Care Planning     Date: 07/20/2024  Patient was not able to provide an Advance Care Plan.            Health Maintenance:  Health Maintenance   Topic Date Due    Hepatitis C Screening  Never done    Colorectal Cancer Screening  03/03/2025 (Originally 7/31/1996)    Lipid Panel  03/18/2029    TETANUS VACCINE  09/01/2032    Shingles Vaccine  Completed       Review of Systems   Constitutional: Negative.  Negative for fever and unexpected weight change.   HENT: Negative.     Respiratory: Negative.     Cardiovascular: Negative.    Gastrointestinal: Negative.    Genitourinary:  Negative for difficulty urinating.   Musculoskeletal: Negative.    Integumentary:  Negative.   Neurological: Negative.    Psychiatric/Behavioral: Negative.        Past Medical History:   Diagnosis Date    Benign paroxysmal vertigo, bilateral     Depression     Gout, unspecified     Hip osteoarthritis     Hypothyroidism        Past Surgical History:   Procedure Laterality Date    CATARACT EXTRACTION Bilateral     HIP REPLACEMENT ARTHROPLASTY      NASAL SEPTUM SURGERY         Family History   Problem Relation Name Age of Onset    Osteoarthritis Mother      Lupus Mother      Hyperlipidemia Father      Heart attack Father  51    No Known Problems Sister 2     Down syndrome Sister 1     Cancer Brother 1         Possibly stomach    No Known Problems Brother      Diabetes Maternal Grandmother      Cancer Maternal Grandfather          skin cancer    Sudden death Maternal Grandfather      Cancer Paternal Grandmother          In leg, resected and cured.    Blindness Paternal Grandfather      Dementia Paternal Grandfather         Social History     Socioeconomic History    Marital status:   "  Tobacco Use    Smoking status: Never    Smokeless tobacco: Never   Substance and Sexual Activity    Alcohol use: Not Currently     Alcohol/week: 7.0 standard drinks of alcohol     Types: 3 Glasses of wine, 4 Shots of liquor per week     Comment: once dalily    Drug use: Never       Current Outpatient Medications   Medication Sig Dispense Refill    levothyroxine (SYNTHROID) 100 MCG tablet Take 100 mcg by mouth.      amoxicillin (AMOXIL) 500 MG Tab Take 1,000 mg by mouth as needed (procedure). (Patient not taking: Reported on 4/18/2024)       No current facility-administered medications for this visit.       Review of patient's allergies indicates:   Allergen Reactions    Iodine Anaphylaxis and Other (See Comments)    Iodine and iodide containing products Anaphylaxis         Objective:       Last 3 sets of Vitals        3/15/2024     9:52 AM 4/18/2024    10:55 AM 7/18/2024    11:20 AM   Vitals - 1 value per visit   SYSTOLIC 118 150 134   DIASTOLIC 72 76 86   Pulse 64 70 67   SPO2 98 % 96 % 97 %   Weight (lb) 203.48 194.45 176.26   Weight (kg) 92.3 88.2 79.95   Height 5' 8" (1.727 m) 5' 8" (1.727 m) 5' 8" (1.727 m)   BMI (Calculated) 30.9 29.6 26.8   Pain Score  Zero Zero   Physical Exam  Constitutional:       General: He is not in acute distress.  HENT:      Head: Normocephalic.      Right Ear: Tympanic membrane, ear canal and external ear normal.      Left Ear: Tympanic membrane, ear canal and external ear normal.      Nose: Nose normal.      Mouth/Throat:      Mouth: Mucous membranes are moist.   Eyes:      General: No scleral icterus.     Extraocular Movements: Extraocular movements intact.      Conjunctiva/sclera: Conjunctivae normal.   Neck:      Vascular: No carotid bruit.      Comments: No goiter.  Cardiovascular:      Rate and Rhythm: Normal rate and regular rhythm.      Pulses: Normal pulses.      Heart sounds: Normal heart sounds.   Pulmonary:      Effort: Pulmonary effort is normal.      Breath sounds: " Normal breath sounds.   Abdominal:      General: Bowel sounds are normal. There is no distension.      Palpations: Abdomen is soft. There is no mass.      Tenderness: There is no abdominal tenderness.   Musculoskeletal:         General: No swelling.   Lymphadenopathy:      Cervical: No cervical adenopathy.   Skin:     General: Skin is warm and dry.   Neurological:      General: No focal deficit present.      Mental Status: He is alert and oriented to person, place, and time.   Psychiatric:         Mood and Affect: Mood normal.         Behavior: Behavior normal.           CBC:  Recent Labs   Lab 03/18/24  0816   WBC 4.95   RBC 4.90   Hemoglobin 15.4   Hematocrit 45.4   Platelets 295   MCV 93   MCH 31.4 H   MCHC 33.9     CMP:  Recent Labs   Lab 03/18/24  0816   Glucose 87   Calcium 9.7   Albumin 4.1   Total Protein 7.8   Sodium 143   Potassium 4.8   CO2 23   Chloride 109   BUN 21   Creatinine 0.9   Alkaline Phosphatase 70   ALT 13   AST 21   Total Bilirubin 0.5     URINALYSIS:       LIPIDS:  Recent Labs   Lab 03/18/24  0816   TSH 1.772   HDL 44   Cholesterol 213 H   Triglycerides 48   LDL Cholesterol 159.4 H   HDL/Cholesterol Ratio 20.7   Non-HDL Cholesterol 169   Total Cholesterol/HDL Ratio 4.8     TSH:  Recent Labs   Lab 03/18/24  0816   TSH 1.772       A1C:  Recent Labs   Lab 03/18/24  0816   Hemoglobin A1C 5.2       Imaging:  X-Ray Chest PA And Lateral  Narrative: EXAMINATION:  XR CHEST PA AND LATERAL    CLINICAL HISTORY:  Acute bronchitis, unspecified    TECHNIQUE:  PA and lateral views of the chest were performed.    COMPARISON:  Chest radiograph 07/07/2018    FINDINGS:  The lungs are clear, with normal appearance of pulmonary vasculature and no pleural effusion or pneumothorax.    The cardiac silhouette is unchanged in size. The hilar and mediastinal contours are unremarkable.    Bones are intact.  Degenerative change.  Impression: No acute intrathoracic abnormality.    Electronically signed by: Carlos  Mauro  Date:    04/18/2024  Time:    12:58      Assessment:       1. Hypothyroidism, unspecified type    2. History of gout    3. Other hyperlipidemia    4. Abnormal blood chemistry    5. Healthcare maintenance            Plan:       1. Hypothyroidism, unspecified type  Overview:  On levothroxine 100 mcg daily.    Assessment & Plan:  Clinically euthyroid. Follow labs.    Orders:  -     TSH; Future; Expected date: 07/18/2024  -     T4, Free; Future; Expected date: 07/18/2024    2. History of gout  Overview:  History of gout attack in the past. No recent event.    Assessment & Plan:  Last uric acid 7.0.  Using allopurinol as needed reports rarely has a flare-up.    Orders:  -     Uric Acid; Future; Expected date: 07/18/2024    3. Other hyperlipidemia  Assessment & Plan:  Preferred conservative management without medications.  Will check labs.    Orders:  -     Lipid Panel; Future; Expected date: 07/18/2024  -     Comprehensive Metabolic Panel; Future; Expected date: 07/18/2024    4. Abnormal blood chemistry  -     Hemoglobin A1C; Future; Expected date: 07/18/2024    5. Healthcare maintenance  Assessment & Plan:  - Yearly labs- done on 03/18/2024.  - Colon cancer screening- declines.  - PSA- declines, no symptoms.  - ACP- HCPOA completed.  Will think about living will.  - Vaccination- due for COVID booster and RSV shots.         Health Maintenance Due   Topic Date Due    Hepatitis C Screening  Never done    RSV Vaccine (Age 60+ and Pregnant patients) (1 - 1-dose 60+ series) Never done    COVID-19 Vaccine (5 - 2023-24 season) 09/01/2023        I spent a total of 3 minutes on the day of the visit.This includes face to face time and non-face to face time preparing to see the patient (eg, review of tests), obtaining and/or reviewing separately obtained history, documenting clinical information in the electronic or other health record, independently interpreting results and communicating results to the  patient/family/caregiver, or care coordinator.       Return to clinic in 3 months.  WS 2    Deanna Jacobs MD  [facility]  Disclaimer:  This note has been generated using voice-recognition software. There may be grammatical or spelling errors that have been missed during proof-reading

## 2024-07-20 NOTE — ASSESSMENT & PLAN NOTE
- Yearly labs- done on 03/18/2024.  - Colon cancer screening- declines.  - PSA- declines, no symptoms.  - ACP- HCPOA completed.  Will think about living will.  - Vaccination- due for COVID booster and RSV shots.

## 2024-10-14 ENCOUNTER — LAB VISIT (OUTPATIENT)
Dept: LAB | Facility: HOSPITAL | Age: 74
End: 2024-10-14
Attending: INTERNAL MEDICINE
Payer: MEDICARE

## 2024-10-14 DIAGNOSIS — E78.49 OTHER HYPERLIPIDEMIA: ICD-10-CM

## 2024-10-14 DIAGNOSIS — R79.9 ABNORMAL BLOOD CHEMISTRY: ICD-10-CM

## 2024-10-14 DIAGNOSIS — E03.9 HYPOTHYROIDISM, UNSPECIFIED TYPE: ICD-10-CM

## 2024-10-14 DIAGNOSIS — Z87.39 HISTORY OF GOUT: ICD-10-CM

## 2024-10-14 LAB
ALBUMIN SERPL BCP-MCNC: 3.8 G/DL (ref 3.5–5.2)
ALP SERPL-CCNC: 73 U/L (ref 55–135)
ALT SERPL W/O P-5'-P-CCNC: 11 U/L (ref 10–44)
ANION GAP SERPL CALC-SCNC: 9 MMOL/L (ref 8–16)
AST SERPL-CCNC: 17 U/L (ref 10–40)
BILIRUB SERPL-MCNC: 0.4 MG/DL (ref 0.1–1)
BUN SERPL-MCNC: 21 MG/DL (ref 8–23)
CALCIUM SERPL-MCNC: 9.6 MG/DL (ref 8.7–10.5)
CHLORIDE SERPL-SCNC: 107 MMOL/L (ref 95–110)
CHOLEST SERPL-MCNC: 177 MG/DL (ref 120–199)
CHOLEST/HDLC SERPL: 3.5 {RATIO} (ref 2–5)
CO2 SERPL-SCNC: 25 MMOL/L (ref 23–29)
CREAT SERPL-MCNC: 0.9 MG/DL (ref 0.5–1.4)
EST. GFR  (NO RACE VARIABLE): >60 ML/MIN/1.73 M^2
ESTIMATED AVG GLUCOSE: 94 MG/DL (ref 68–131)
GLUCOSE SERPL-MCNC: 94 MG/DL (ref 70–110)
HBA1C MFR BLD: 4.9 % (ref 4–5.6)
HDLC SERPL-MCNC: 50 MG/DL (ref 40–75)
HDLC SERPL: 28.2 % (ref 20–50)
LDLC SERPL CALC-MCNC: 118.8 MG/DL (ref 63–159)
NONHDLC SERPL-MCNC: 127 MG/DL
POTASSIUM SERPL-SCNC: 5.1 MMOL/L (ref 3.5–5.1)
PROT SERPL-MCNC: 7.4 G/DL (ref 6–8.4)
SODIUM SERPL-SCNC: 141 MMOL/L (ref 136–145)
T4 FREE SERPL-MCNC: 1.07 NG/DL (ref 0.71–1.51)
TRIGL SERPL-MCNC: 41 MG/DL (ref 30–150)
TSH SERPL DL<=0.005 MIU/L-ACNC: 0.74 UIU/ML (ref 0.4–4)
URATE SERPL-MCNC: 5.9 MG/DL (ref 3.4–7)

## 2024-10-14 PROCEDURE — 84439 ASSAY OF FREE THYROXINE: CPT | Performed by: INTERNAL MEDICINE

## 2024-10-14 PROCEDURE — 80061 LIPID PANEL: CPT | Performed by: INTERNAL MEDICINE

## 2024-10-14 PROCEDURE — 84550 ASSAY OF BLOOD/URIC ACID: CPT | Performed by: INTERNAL MEDICINE

## 2024-10-14 PROCEDURE — 84443 ASSAY THYROID STIM HORMONE: CPT | Performed by: INTERNAL MEDICINE

## 2024-10-14 PROCEDURE — 80053 COMPREHEN METABOLIC PANEL: CPT | Performed by: INTERNAL MEDICINE

## 2024-10-14 PROCEDURE — 83036 HEMOGLOBIN GLYCOSYLATED A1C: CPT | Performed by: INTERNAL MEDICINE

## 2024-10-21 ENCOUNTER — OFFICE VISIT (OUTPATIENT)
Dept: PRIMARY CARE CLINIC | Facility: CLINIC | Age: 74
End: 2024-10-21
Payer: MEDICARE

## 2024-10-21 VITALS
BODY MASS INDEX: 28.07 KG/M2 | DIASTOLIC BLOOD PRESSURE: 88 MMHG | WEIGHT: 185.19 LBS | HEIGHT: 68 IN | OXYGEN SATURATION: 99 % | HEART RATE: 74 BPM | SYSTOLIC BLOOD PRESSURE: 134 MMHG

## 2024-10-21 DIAGNOSIS — Z87.39 HISTORY OF GOUT: ICD-10-CM

## 2024-10-21 DIAGNOSIS — Z00.00 HEALTHCARE MAINTENANCE: ICD-10-CM

## 2024-10-21 DIAGNOSIS — E78.49 OTHER HYPERLIPIDEMIA: ICD-10-CM

## 2024-10-21 DIAGNOSIS — R79.9 ABNORMAL BLOOD CHEMISTRY: ICD-10-CM

## 2024-10-21 DIAGNOSIS — K90.9 INTESTINAL MALABSORPTION, UNSPECIFIED TYPE: ICD-10-CM

## 2024-10-21 DIAGNOSIS — E03.9 HYPOTHYROIDISM, UNSPECIFIED TYPE: Primary | ICD-10-CM

## 2024-10-21 PROCEDURE — 3044F HG A1C LEVEL LT 7.0%: CPT | Mod: CPTII,S$GLB,, | Performed by: INTERNAL MEDICINE

## 2024-10-21 PROCEDURE — 3079F DIAST BP 80-89 MM HG: CPT | Mod: CPTII,S$GLB,, | Performed by: INTERNAL MEDICINE

## 2024-10-21 PROCEDURE — 1101F PT FALLS ASSESS-DOCD LE1/YR: CPT | Mod: CPTII,S$GLB,, | Performed by: INTERNAL MEDICINE

## 2024-10-21 PROCEDURE — 1159F MED LIST DOCD IN RCRD: CPT | Mod: CPTII,S$GLB,, | Performed by: INTERNAL MEDICINE

## 2024-10-21 PROCEDURE — 1126F AMNT PAIN NOTED NONE PRSNT: CPT | Mod: CPTII,S$GLB,, | Performed by: INTERNAL MEDICINE

## 2024-10-21 PROCEDURE — 99999 PR PBB SHADOW E&M-EST. PATIENT-LVL III: CPT | Mod: PBBFAC,,, | Performed by: INTERNAL MEDICINE

## 2024-10-21 PROCEDURE — 99214 OFFICE O/P EST MOD 30 MIN: CPT | Mod: S$GLB,,, | Performed by: INTERNAL MEDICINE

## 2024-10-21 PROCEDURE — 3075F SYST BP GE 130 - 139MM HG: CPT | Mod: CPTII,S$GLB,, | Performed by: INTERNAL MEDICINE

## 2024-10-21 PROCEDURE — 1160F RVW MEDS BY RX/DR IN RCRD: CPT | Mod: CPTII,S$GLB,, | Performed by: INTERNAL MEDICINE

## 2024-10-21 PROCEDURE — 3008F BODY MASS INDEX DOCD: CPT | Mod: CPTII,S$GLB,, | Performed by: INTERNAL MEDICINE

## 2024-10-21 PROCEDURE — 1157F ADVNC CARE PLAN IN RCRD: CPT | Mod: CPTII,S$GLB,, | Performed by: INTERNAL MEDICINE

## 2024-10-21 PROCEDURE — 3288F FALL RISK ASSESSMENT DOCD: CPT | Mod: CPTII,S$GLB,, | Performed by: INTERNAL MEDICINE

## 2024-10-21 NOTE — PROGRESS NOTES
Subjective:       Patient ID: Patrick Christy is a 74 y.o. male.    Chief Complaint: Hypothyroidism      HPI  Patrick Christy is a 74 y.o. male with  hypothyroidism, hyperlipidemia, depression, gout  who presents today for Hypothyroidism    Reports he feels well.  No recent gout episodes.    Noticed that a few months ago he took levocetirizine for a couple of weeks then kidney was levothyroxine.  He change the medication back to levothyroxine but notices there was no symptoms of hypo or hyperthyroidism.  Takes his medication in the morning and occasionally may drink black coffee with no sweeteners or Creamer. Recent labs with normal free T4 and TSH.    Lipid profile looks much better.  He is following a low carb diet with intermittent fasting.  Notices if he starts adding carbs, will start feeling hungry and is hard to fast.  He does include in his diet vegetables with low glycemic index and usually tries to decrease or not include fruit.    Exercises daily. Walks in treadmill and lifts weights at home. Enjoys reading or listening to educational audiobooks.    Has no toxic habits. He is . Mood is sometimes down after listening to the news and feels better when stepping away for a short time.    Health Maintenance:  Health Maintenance   Topic Date Due    Hepatitis C Screening  Never done    Colorectal Cancer Screening  03/03/2025 (Originally 7/31/1996)    Lipid Panel  10/14/2029    TETANUS VACCINE  09/01/2032    Shingles Vaccine  Completed       Review of Systems   Constitutional:  Negative for activity change and unexpected weight change.   HENT:  Negative for hearing loss, rhinorrhea and trouble swallowing.    Eyes:  Negative for discharge and visual disturbance.   Respiratory:  Negative for chest tightness and wheezing.    Cardiovascular:  Negative for chest pain and palpitations.   Gastrointestinal:  Negative for blood in stool, constipation, diarrhea and vomiting.   Endocrine: Negative for polydipsia and  polyuria.   Genitourinary:  Negative for difficulty urinating, hematuria and urgency.   Musculoskeletal:  Negative for arthralgias, joint swelling and neck pain.   Neurological:  Negative for weakness and headaches.   Psychiatric/Behavioral:  Negative for confusion and dysphoric mood.       Past Medical History:   Diagnosis Date    Benign paroxysmal vertigo, bilateral     Depression     Gout, unspecified     Hip osteoarthritis     Hypothyroidism        Past Surgical History:   Procedure Laterality Date    CATARACT EXTRACTION Bilateral     HIP REPLACEMENT ARTHROPLASTY      NASAL SEPTUM SURGERY         Family History   Problem Relation Name Age of Onset    Osteoarthritis Mother      Lupus Mother      Hyperlipidemia Father      Heart attack Father  51    No Known Problems Sister 2     Down syndrome Sister 1     Cancer Brother 1         Possibly stomach    No Known Problems Brother      Diabetes Maternal Grandmother      Cancer Maternal Grandfather          skin cancer    Sudden death Maternal Grandfather      Cancer Paternal Grandmother          In leg, resected and cured.    Blindness Paternal Grandfather      Dementia Paternal Grandfather         Social History     Socioeconomic History    Marital status:    Tobacco Use    Smoking status: Never    Smokeless tobacco: Never   Substance and Sexual Activity    Alcohol use: Not Currently     Alcohol/week: 7.0 standard drinks of alcohol     Types: 3 Glasses of wine, 4 Shots of liquor per week     Comment: once dalily    Drug use: Never     Social Drivers of Health     Financial Resource Strain: Low Risk  (10/14/2024)    Overall Financial Resource Strain (CARDIA)     Difficulty of Paying Living Expenses: Not hard at all   Food Insecurity: No Food Insecurity (10/14/2024)    Hunger Vital Sign     Worried About Running Out of Food in the Last Year: Never true     Ran Out of Food in the Last Year: Never true   Physical Activity: Unknown (10/14/2024)    Exercise Vital  "Sign     Days of Exercise per Week: 7 days   Stress: No Stress Concern Present (10/14/2024)    [employer] [place] [employer] - Occupational Stress Questionnaire     Feeling of Stress : Only a little   Housing Stability: Unknown (10/14/2024)    Housing Stability Vital Sign     Unable to Pay for Housing in the Last Year: No       Current Outpatient Medications   Medication Sig Dispense Refill    levothyroxine (SYNTHROID) 100 MCG tablet Take 100 mcg by mouth.       No current facility-administered medications for this visit.       Review of patient's allergies indicates:   Allergen Reactions    Iodine Anaphylaxis and Other (See Comments)    Iodine and iodide containing products Anaphylaxis         Objective:       Last 3 sets of Vitals        4/18/2024    10:55 AM 7/18/2024    11:20 AM 10/21/2024    11:10 AM   Vitals - 1 value per visit   SYSTOLIC 150 134 134   DIASTOLIC 76 86 88   Pulse 70 67 74   SPO2 96 % 97 % 99 %   Weight (lb) 194.45 176.26 185.19   Weight (kg) 88.2 79.95 84   Height 5' 8" (1.727 m) 5' 8" (1.727 m) 5' 8" (1.727 m)   BMI (Calculated) 29.6 26.8 28.2   Pain Score Zero Zero Zero   Physical Exam  Constitutional:       General: He is not in acute distress.  HENT:      Head: Normocephalic.      Right Ear: Tympanic membrane, ear canal and external ear normal.      Left Ear: Tympanic membrane, ear canal and external ear normal.      Nose: Nose normal.      Mouth/Throat:      Mouth: Mucous membranes are moist.   Eyes:      General: No scleral icterus.     Extraocular Movements: Extraocular movements intact.      Conjunctiva/sclera: Conjunctivae normal.   Neck:      Vascular: No carotid bruit.      Comments: No goiter.  Cardiovascular:      Rate and Rhythm: Normal rate and regular rhythm.      Pulses: Normal pulses.      Heart sounds: Normal heart sounds.   Pulmonary:      Effort: Pulmonary effort is normal.      Breath sounds: Normal breath sounds.   Abdominal:      General: Bowel sounds are " normal. There is no distension.      Palpations: Abdomen is soft. There is no mass.      Tenderness: There is no abdominal tenderness.   Musculoskeletal:         General: No swelling.   Lymphadenopathy:      Cervical: No cervical adenopathy.   Skin:     General: Skin is warm and dry.   Neurological:      General: No focal deficit present.      Mental Status: He is alert and oriented to person, place, and time.   Psychiatric:         Mood and Affect: Mood normal.         Behavior: Behavior normal.      Comments: Appears to be in good spirits.           CBC:  Recent Labs   Lab 03/18/24 0816   WBC 4.95   RBC 4.90   Hemoglobin 15.4   Hematocrit 45.4   Platelets 295   MCV 93   MCH 31.4 H   MCHC 33.9     CMP:  Recent Labs   Lab 03/18/24 0816 10/14/24  0817   Glucose 87 94   Calcium 9.7 9.6   Albumin 4.1 3.8   Total Protein 7.8 7.4   Sodium 143 141   Potassium 4.8 5.1   CO2 23 25   Chloride 109 107   BUN 21 21   Creatinine 0.9 0.9   Alkaline Phosphatase 70 73   ALT 13 11   AST 21 17   Total Bilirubin 0.5 0.4     URINALYSIS:       LIPIDS:  Recent Labs   Lab 03/18/24  0816 10/14/24  0817   TSH 1.772 0.736   HDL 44 50   Cholesterol 213 H 177   Triglycerides 48 41   LDL Cholesterol 159.4 H 118.8   HDL/Cholesterol Ratio 20.7 28.2   Non-HDL Cholesterol 169 127   Total Cholesterol/HDL Ratio 4.8 3.5     TSH:  Recent Labs   Lab 03/18/24  0816 10/14/24  0817   TSH 1.772 0.736       A1C:  Recent Labs   Lab 03/18/24 0816 10/14/24  0817   Hemoglobin A1C 5.2 4.9       Imaging:  X-Ray Chest PA And Lateral  Narrative: EXAMINATION:  XR CHEST PA AND LATERAL    CLINICAL HISTORY:  Acute bronchitis, unspecified    TECHNIQUE:  PA and lateral views of the chest were performed.    COMPARISON:  Chest radiograph 07/07/2018    FINDINGS:  The lungs are clear, with normal appearance of pulmonary vasculature and no pleural effusion or pneumothorax.    The cardiac silhouette is unchanged in size. The hilar and mediastinal contours are  unremarkable.    Bones are intact.  Degenerative change.  Impression: No acute intrathoracic abnormality.    Electronically signed by: Carlos Wade  Date:    04/18/2024  Time:    12:58      Assessment:       1. Hypothyroidism, unspecified type    2. Other hyperlipidemia    3. History of gout    4. Abnormal blood chemistry    5. Intestinal malabsorption, unspecified type    6. Healthcare maintenance            Plan:       1. Hypothyroidism, unspecified type  Overview:  On levothroxine 100 mcg daily.    Assessment & Plan:  Clinically euthyroid. Follow labs.    Orders:  -     Ferritin; Future; Expected date: 10/21/2024  -     Iron and TIBC; Future; Expected date: 10/21/2024  -     Vitamin B12; Future; Expected date: 10/21/2024  -     TSH; Future; Expected date: 10/21/2024  -     T4, Free; Future; Expected date: 10/21/2024    2. Other hyperlipidemia  Assessment & Plan:  Nicely improved lipid profile. LDL still higher than recommended.   Calculated 10 yr ASCVD risk is 22%.   Prefers conservative management without medications.    Has a low carb diet, follows Intermittent fasting (36h fast). Exercises regularly.  Continue to monitor labs.    Orders:  -     Comprehensive Metabolic Panel; Future; Expected date: 10/21/2024  -     Lipid Panel; Future; Expected date: 10/21/2024    3. History of gout  Overview:  History of gout attack in the past. No recent event.    Assessment & Plan:  Last uric acid 5.9.  No recent flair ups.    Orders:  -     Comprehensive Metabolic Panel; Future; Expected date: 10/21/2024  -     Vitamin D; Future; Expected date: 10/21/2024  -     Uric Acid; Future; Expected date: 10/21/2024    4. Abnormal blood chemistry  -     Ferritin; Future; Expected date: 10/21/2024  -     Iron and TIBC; Future; Expected date: 10/21/2024  -     Vitamin B12; Future; Expected date: 10/21/2024  -     Folate; Future; Expected date: 10/21/2024  -     CBC Auto Differential; Future; Expected date: 10/21/2024  -     Hemoglobin  A1C; Future; Expected date: 10/21/2024  -     Uric Acid; Future; Expected date: 10/21/2024    5. Intestinal malabsorption, unspecified type  -     Vitamin B12; Future; Expected date: 10/21/2024  -     Folate; Future; Expected date: 10/21/2024  -     Vitamin D; Future; Expected date: 10/21/2024    6. Healthcare maintenance  Assessment & Plan:  - Yearly labs- done on 03/18/2024.  - Colon cancer screening- declines.  - PSA- declines, no symptoms.  - ACP- HCPOA completed.    - Vaccination- up to date.         Health Maintenance Due   Topic Date Due    Hepatitis C Screening  Never done        I spent a total of 30 minutes on the day of the visit.This includes face to face time and non-face to face time preparing to see the patient (eg, review of tests), obtaining and/or reviewing separately obtained history, documenting clinical information in the electronic or other health record, independently interpreting results and communicating results to the patient/family/caregiver, or care coordinator.       Return to clinic in 6 months.  WS 1    Deanna Jacobs MD  [facility]  Disclaimer:  This note has been generated using voice-recognition software. There may be grammatical or spelling errors that have been missed during proof-reading

## 2024-10-22 NOTE — ASSESSMENT & PLAN NOTE
- Yearly labs- done on 03/18/2024.  - Colon cancer screening- declines.  - PSA- declines, no symptoms.  - ACP- HCPOA completed.    - Vaccination- up to date.

## 2024-10-22 NOTE — ASSESSMENT & PLAN NOTE
Nicely improved lipid profile. LDL still higher than recommended.   Calculated 10 yr ASCVD risk is 22%.   Prefers conservative management without medications.    Has a low carb diet, follows Intermittent fasting (36h fast). Exercises regularly.  Continue to monitor labs.

## 2024-10-24 ENCOUNTER — PATIENT MESSAGE (OUTPATIENT)
Dept: RESEARCH | Facility: HOSPITAL | Age: 74
End: 2024-10-24
Payer: MEDICARE

## 2024-12-31 RX ORDER — ALLOPURINOL 100 MG/1
100 TABLET ORAL
Qty: 90 TABLET | Refills: 3 | Status: SHIPPED | OUTPATIENT
Start: 2024-12-31

## 2025-01-16 RX ORDER — LEVOTHYROXINE SODIUM 100 UG/1
100 TABLET ORAL
Qty: 90 TABLET | Refills: 3 | Status: SHIPPED | OUTPATIENT
Start: 2025-01-16

## 2025-01-16 NOTE — TELEPHONE ENCOUNTER
----- Message from Tala sent at 1/16/2025 10:08 AM CST -----  e:  RX Refill Request        Who Called: pt        RX Name and Strength:   levothyroxine  levothyroxine (SYNTHROID) 100 MCG tablet          How is the patient currently taking it? (ex. 1XDay): 1 x day        Is this a 30 day or 90 day RX:         Preferred Pharmacy with phone number:  [facility] - [address] (Ph: [phone])        Local or Mail Order: mail        Ordering Provider: Arlene        Would the patient rather a call back or a response via [facility]? call        Best Call Back Number: [phone]        Additional Information: call back

## 2025-03-26 ENCOUNTER — PATIENT MESSAGE (OUTPATIENT)
Dept: PRIMARY CARE CLINIC | Facility: CLINIC | Age: 75
End: 2025-03-26
Payer: MEDICARE

## 2025-04-07 ENCOUNTER — OFFICE VISIT (OUTPATIENT)
Dept: PRIMARY CARE CLINIC | Facility: CLINIC | Age: 75
End: 2025-04-07
Payer: MEDICARE

## 2025-04-07 VITALS
HEIGHT: 68 IN | DIASTOLIC BLOOD PRESSURE: 82 MMHG | OXYGEN SATURATION: 99 % | SYSTOLIC BLOOD PRESSURE: 146 MMHG | BODY MASS INDEX: 30.67 KG/M2 | HEART RATE: 67 BPM | WEIGHT: 202.38 LBS

## 2025-04-07 DIAGNOSIS — F32.1 MODERATE MAJOR DEPRESSION: Primary | ICD-10-CM

## 2025-04-07 DIAGNOSIS — R03.0 ELEVATED BLOOD PRESSURE READING: ICD-10-CM

## 2025-04-07 DIAGNOSIS — R42 DISEQUILIBRIUM: ICD-10-CM

## 2025-04-07 DIAGNOSIS — E66.9 OBESITY (BMI 30-39.9): ICD-10-CM

## 2025-04-07 DIAGNOSIS — M54.9 BACK PAIN WITHOUT SCIATICA: ICD-10-CM

## 2025-04-07 DIAGNOSIS — E03.9 HYPOTHYROIDISM, UNSPECIFIED TYPE: ICD-10-CM

## 2025-04-07 DIAGNOSIS — Z87.39 HISTORY OF GOUT: ICD-10-CM

## 2025-04-07 DIAGNOSIS — E78.49 OTHER HYPERLIPIDEMIA: ICD-10-CM

## 2025-04-07 DIAGNOSIS — Z00.00 HEALTHCARE MAINTENANCE: ICD-10-CM

## 2025-04-07 PROBLEM — E66.811 OBESITY, CLASS I, BMI 30-34.9: Status: RESOLVED | Noted: 2024-03-15 | Resolved: 2025-04-07

## 2025-04-07 PROCEDURE — 3008F BODY MASS INDEX DOCD: CPT | Mod: CPTII,S$GLB,, | Performed by: INTERNAL MEDICINE

## 2025-04-07 PROCEDURE — 3079F DIAST BP 80-89 MM HG: CPT | Mod: CPTII,S$GLB,, | Performed by: INTERNAL MEDICINE

## 2025-04-07 PROCEDURE — 99999 PR PBB SHADOW E&M-EST. PATIENT-LVL III: CPT | Mod: PBBFAC,,, | Performed by: INTERNAL MEDICINE

## 2025-04-07 PROCEDURE — 1160F RVW MEDS BY RX/DR IN RCRD: CPT | Mod: CPTII,S$GLB,, | Performed by: INTERNAL MEDICINE

## 2025-04-07 PROCEDURE — 3077F SYST BP >= 140 MM HG: CPT | Mod: CPTII,S$GLB,, | Performed by: INTERNAL MEDICINE

## 2025-04-07 PROCEDURE — 99215 OFFICE O/P EST HI 40 MIN: CPT | Mod: S$GLB,,, | Performed by: INTERNAL MEDICINE

## 2025-04-07 PROCEDURE — 1159F MED LIST DOCD IN RCRD: CPT | Mod: CPTII,S$GLB,, | Performed by: INTERNAL MEDICINE

## 2025-04-07 PROCEDURE — 1157F ADVNC CARE PLAN IN RCRD: CPT | Mod: CPTII,S$GLB,, | Performed by: INTERNAL MEDICINE

## 2025-04-07 PROCEDURE — 1126F AMNT PAIN NOTED NONE PRSNT: CPT | Mod: CPTII,S$GLB,, | Performed by: INTERNAL MEDICINE

## 2025-04-07 RX ORDER — NALTREXONE HYDROCHLORIDE AND BUPROPION HYDROCHLORIDE 8; 90 MG/1; MG/1
TABLET, EXTENDED RELEASE ORAL
Qty: 120 TABLET | Refills: 3 | Status: SHIPPED | OUTPATIENT
Start: 2025-04-07 | End: 2025-04-07

## 2025-04-07 RX ORDER — NALTREXONE HYDROCHLORIDE AND BUPROPION HYDROCHLORIDE 8; 90 MG/1; MG/1
TABLET, EXTENDED RELEASE ORAL
Qty: 120 TABLET | Refills: 3 | Status: SHIPPED | OUTPATIENT
Start: 2025-04-07 | End: 2025-04-08

## 2025-04-07 NOTE — PROGRESS NOTES
Subjective:       Patient ID: Patrick Christy is a 74 y.o. male.    Chief Complaint: Hypothyroidism      HPI  Patrick Christy is a 74 y.o. male with hypothyroidism, hyperlipidemia, depression, gout who presents today for Hypothyroidism    He reports feeling tired and down with persistent negative thoughts, which is uncharacteristic of his typically positive outlook. He describes focusing on negative aspects of his surroundings, such as noticing spots on the window instead of appreciating the beauty outside. Denies ideas of self harm.     He avoids leaving the house except for weekly restaurant visits, preferring to order everything by mail. He avoids social functions and visiting people. He has a history of childhood agoraphobia between ages [age], during which he experienced distress about attending school, manifesting in crying and tantrums. He is unsure about past medication treatment for this condition.    He reports gaining 20-30 lbs due to emotional eating as a stress coping mechanism, noting temporary calming effects. He has difficulty fitting into his clothes. Reports poor motivation for exercising or doing things that he would normally enjoy. His exercise routine includes daily treadmill walking (reduced from 3 miles to 1 mile) with TV and radio for entertainment, and weight lifting every other day.    He experiences balance issues with instability, particularly when leaning forward without support. He has fallen twice recently - once while chasing his dog outdoors (tripped over grass) and once in his poorly lit bedroom. He denies associated dizziness. Sleeps ok.    He has 95% blockage in one ear causing significant hearing loss. He uses hearing aids daily with good results after trying three different models, though occasionally has difficulty distinguishing sounds in the affected ear. Finds the hearing aid has helped imbalance.    He reports occasional vodka consumption, maximum 2-3 drinks at a time, stored  in garage refrigerator. Does not drink every day.    He experiences occasional mild right-sided kidney/left lower back pain at night when lying down, which improves with position changes. He denies daytime pain, back pain, or constant pain. His urine appears normal and clear.             Review of Systems   Constitutional:  Positive for appetite change and unexpected weight change (has gained weight). Negative for activity change.   HENT:  Negative for hearing loss, rhinorrhea and trouble swallowing.    Eyes:  Negative for discharge and visual disturbance.   Respiratory:  Negative for chest tightness and wheezing.    Cardiovascular:  Negative for chest pain and palpitations.   Gastrointestinal:  Negative for blood in stool, constipation, diarrhea and vomiting.   Endocrine: Negative for polydipsia and polyuria.   Genitourinary:  Negative for difficulty urinating, hematuria and urgency.   Musculoskeletal:  Positive for back pain. Negative for arthralgias, joint swelling and neck pain.   Neurological:  Negative for weakness and headaches.        Balance problems   Psychiatric/Behavioral:  Positive for dysphoric mood. Negative for confusion.       Past Medical History:   Diagnosis Date    Benign paroxysmal vertigo, bilateral     Depression     Gout, unspecified     Hip osteoarthritis     Hypothyroidism        Past Surgical History:   Procedure Laterality Date    CATARACT EXTRACTION Bilateral     HIP REPLACEMENT ARTHROPLASTY      NASAL SEPTUM SURGERY         Family History   Problem Relation Name Age of Onset    Osteoarthritis Mother      Lupus Mother      Hyperlipidemia Father      Heart attack Father  51    No Known Problems Sister 2     Down syndrome Sister 1     Cancer Brother 1         Possibly stomach    No Known Problems Brother      Diabetes Maternal Grandmother      Cancer Maternal Grandfather          skin cancer    Sudden death Maternal Grandfather      Cancer Paternal Grandmother          In leg, resected and  cured.    Blindness Paternal Grandfather      Dementia Paternal Grandfather         Social History     Socioeconomic History    Marital status:    Tobacco Use    Smoking status: Never    Smokeless tobacco: Never   Substance and Sexual Activity    Alcohol use: Not Currently     Alcohol/week: 7.0 standard drinks of alcohol     Types: 3 Glasses of wine, 4 Shots of liquor per week     Comment: once dalily    Drug use: Never     Social Drivers of Health     Financial Resource Strain: Low Risk  (4/4/2025)    Overall Financial Resource Strain (CARDIA)     Difficulty of Paying Living Expenses: Not hard at all   Food Insecurity: No Food Insecurity (4/4/2025)    Hunger Vital Sign     Worried About Running Out of Food in the Last Year: Never true     Ran Out of Food in the Last Year: Never true   Transportation Needs: No Transportation Needs (4/4/2025)    PRAPARE - Transportation     Lack of Transportation (Medical): No     Lack of Transportation (Non-Medical): No   Physical Activity: Sufficiently Active (4/4/2025)    Exercise Vital Sign     Days of Exercise per Week: 7 days     Minutes of Exercise per Session: 30 min   Stress: Stress Concern Present (4/4/2025)    [employer] - Occupational Stress Questionnaire     Feeling of Stress : To some extent   Housing Stability: Low Risk  (4/4/2025)    Housing Stability Vital Sign     Unable to Pay for Housing in the Last Year: No     Number of Times Moved in the Last Year: 0     Homeless in the Last Year: No       Current Medications[1]    Review of patient's allergies indicates:   Allergen Reactions    Iodine Anaphylaxis and Other (See Comments)    Iodine and iodide containing products Anaphylaxis         Objective:       Last 3 sets of Vitals        7/18/2024    11:20 AM 10/21/2024    11:10 AM 4/7/2025     9:12 AM   Vitals - 1 value per visit   SYSTOLIC 134 134 146   DIASTOLIC 86 88 82   Pulse 67 74 67   SPO2 97 % 99 % 99 %   Weight (lb) 176.26  "185.19 202.38   Weight (kg) 79.95 84 91.8   Height 5' 8" (1.727 m) 5' 8" (1.727 m) 5' 8" (1.727 m)   BMI (Calculated) 26.8 28.2 30.8   Pain Score Zero Zero Zero   Physical Exam  Constitutional:       General: He is not in acute distress.  HENT:      Head: Normocephalic.      Right Ear: Tympanic membrane, ear canal and external ear normal.      Left Ear: Tympanic membrane, ear canal and external ear normal.      Nose: Nose normal.      Mouth/Throat:      Mouth: Mucous membranes are moist.   Eyes:      General: No scleral icterus.     Extraocular Movements: Extraocular movements intact.      Conjunctiva/sclera: Conjunctivae normal.   Neck:      Vascular: No carotid bruit.      Comments: No goiter.  Cardiovascular:      Rate and Rhythm: Normal rate and regular rhythm.      Pulses: Normal pulses.      Heart sounds: Normal heart sounds.   Pulmonary:      Effort: Pulmonary effort is normal.      Breath sounds: Normal breath sounds.   Abdominal:      General: Bowel sounds are normal. There is no distension.      Palpations: Abdomen is soft. There is no mass.      Tenderness: There is no abdominal tenderness. There is no right CVA tenderness or left CVA tenderness.   Musculoskeletal:         General: No swelling.   Lymphadenopathy:      Cervical: No cervical adenopathy.   Skin:     General: Skin is warm and dry.   Neurological:      General: No focal deficit present.      Mental Status: He is alert and oriented to person, place, and time.      Motor: No weakness.      Gait: Gait normal.      Comments: Stands up unassisted with no loss of balance, normal gait, able to sit on exam table without assistance.  No facial asymmetry, clear speech.    Psychiatric:         Mood and Affect: Mood normal.         Behavior: Behavior normal.      Comments: Good eye contact, answers questions appropriately, conversant.           CBC:  Recent Labs   Lab 03/18/24  0816   WBC 4.95   RBC 4.90   Hemoglobin 15.4   Hematocrit 45.4   Platelets 295 "   MCV 93   MCH 31.4 H   MCHC 33.9     CMP:  Recent Labs   Lab 03/18/24  0816 10/14/24  0817   Glucose 87 94   Calcium 9.7 9.6   Albumin 4.1 3.8   Total Protein 7.8 7.4   Sodium 143 141   Potassium 4.8 5.1   CO2 23 25   Chloride 109 107   BUN 21 21   Creatinine 0.9 0.9   Alkaline Phosphatase 70 73   ALT 13 11   AST 21 17   Total Bilirubin 0.5 0.4     URINALYSIS:       LIPIDS:  Recent Labs   Lab 03/18/24  0816 10/14/24  0817   TSH 1.772 0.736   HDL 44 50   Cholesterol 213 H 177   Triglycerides 48 41   LDL Cholesterol 159.4 H 118.8   HDL/Cholesterol Ratio 20.7 28.2   Non-HDL Cholesterol 169 127   Total Cholesterol/HDL Ratio 4.8 3.5     TSH:  Recent Labs   Lab 03/18/24  0816 10/14/24  0817   TSH 1.772 0.736       A1C:  Recent Labs   Lab 03/18/24  0816 10/14/24  0817   Hemoglobin A1C 5.2 4.9       Imaging:  X-Ray Chest PA And Lateral  Narrative: EXAMINATION:  XR CHEST PA AND LATERAL    CLINICAL HISTORY:  Acute bronchitis, unspecified    TECHNIQUE:  PA and lateral views of the chest were performed.    COMPARISON:  Chest radiograph 07/07/2018    FINDINGS:  The lungs are clear, with normal appearance of pulmonary vasculature and no pleural effusion or pneumothorax.    The cardiac silhouette is unchanged in size. The hilar and mediastinal contours are unremarkable.    Bones are intact.  Degenerative change.  Impression: No acute intrathoracic abnormality.    Electronically signed by: Carlos Wade  Date:    04/18/2024  Time:    12:58      Assessment:       1. Moderate major depression    2. Obesity (BMI 30-39.9)    3. Hypothyroidism, unspecified type    4. Elevated blood pressure reading    5. Other hyperlipidemia    6. Disequilibrium    7. History of gout    8. Back pain without sciatica    9. Healthcare maintenance            Plan:       1. Moderate major depression  Assessment & Plan:  - Prescribed Contrave (bupropion/naltrexone combination) for depression and weight management.  - If unable to get Contrave then will try  Wellbutrin XL.  - Discussed potential side effects, including nervousness, irritability, and initial worsening of depression symptoms.  - Advised to report any concerns.  - Plan to monitor BP and heart rate.  - Patrick to reduce alcohol intake while on medication.    Orders:  -     Discontinue: naltrexone-bupropion (CONTRAVE) 8-90 mg TbSR; Take 1 tablet oral daily x 1 week,   Then 1 tab oral twice a day x 1 week.  Then 2 tabs in am and 1 tab in the evening,  Then 2 tabs twice a day.  Dispense: 120 tablet; Refill: 3  -     naltrexone-bupropion (CONTRAVE) 8-90 mg TbSR; Take 1 tablet oral daily x 1 week, Then 1 tab oral twice a day x 1 week. Then 2 tabs in am and 1 tab in the evening, Then 2 tabs twice a day.  Dispense: 120 tablet; Refill: 3    2. Obesity (BMI 30-39.9)  Assessment & Plan:  - BMI: 30, mildly elevated blood pressure, and hyperlipidemia.  - Discussed emotional and binge eating patterns.  - Prescribed Contrave for both depression and weight management, explaining its potential effect on food cravings.  - Recommend low-carbohydrate diet, intermittent fasting worked for him before, and consistent exercise (minimum 1 mile daily).  - Suggested Edgar Chi and/or joining a dance class for exercise and social interaction.  - Ordered thyroid function test due to recent weight gain.    Orders:  -     Discontinue: naltrexone-bupropion (CONTRAVE) 8-90 mg TbSR; Take 1 tablet oral daily x 1 week,   Then 1 tab oral twice a day x 1 week.  Then 2 tabs in am and 1 tab in the evening,  Then 2 tabs twice a day.  Dispense: 120 tablet; Refill: 3  -     naltrexone-bupropion (CONTRAVE) 8-90 mg TbSR; Take 1 tablet oral daily x 1 week, Then 1 tab oral twice a day x 1 week. Then 2 tabs in am and 1 tab in the evening, Then 2 tabs twice a day.  Dispense: 120 tablet; Refill: 3    3. Hypothyroidism, unspecified type  Overview:  On levothroxine 100 mcg daily.    Assessment & Plan:  Last labs were normal.   Reevaluate with weight changes and  fatigue.      4. Elevated blood pressure reading  Assessment & Plan:  Reports blood pressure tends to be higher in am after drinking coffee and improves later during the day. Notices normal blood pressure before coffee.  Monitor blood pressure and keep log for next visit.      5. Other hyperlipidemia  Assessment & Plan:  Last Calculated 10 yr ASCVD risk is 22%.   Prefers conservative management without medications.    Reevaluate labs with diet and weight changes.  Exercises regularly and plan meals.        6. Disequilibrium  Overview:  Reports episode of disequilibrium. No loss of balance during visit. Evaluate labs and consider PT, vision, neurology or ENT evaluation if not already done. No focal signs noted other than his hearing loss needing aids.    Assessment & Plan:  - Patrick reports history of 95% hearing loss in one ear, using hearing aids with good results from the latest one..  - Had 2 falls by tripping and feels balance did not help. No significant injuries or loss of consciousness reported. Denies dizziness.  - Considered potential causes, including hearing issues and fluid intake.  - Recommend continued use of hearing aids and maintaining adequate hydration.  - Follow labs.      7. History of gout  Overview:  History of gout attack in the past. No recent event.    Assessment & Plan:  Last uric acid was normal.  No recent flair ups.      8. Back pain without sciatica  Assessment & Plan:  - Patrick reports occasional mild pain in one kidney area.  - Physical exam found no pain on palpation.  - Urine appears normal and clear.  - Ordered urinalysis to evaluate reported kidney pain and check for blood in urine.  - Recommend increased water intake and avoiding soft drinks.    Orders:  -     Urinalysis, Reflex to Urine Culture; Future; Expected date: 04/07/2025    9. Healthcare maintenance  Assessment & Plan:  - Yearly labs- done on 10/14/2024. New labs ordered.  - Colon cancer screening- declined.  - PSA-  declines, no symptoms.  - ACP- HCPOA completed.    - Vaccination- up to date.         FOLLOW-UP AND MONITORING:  - Patrick to monitor BP in the morning and evening.  - Instructed to contact the office if experiencing any concerns with the new medication.  - Scheduled follow up in 4 weeks to evaluate medication effectiveness and overall progress.        Health Maintenance Due   Topic Date Due    Hepatitis C Screening  Never done    Colorectal Cancer Screening  Never done      I spent a total of 40 minutes on the day of the visit.  This includes face to face time and non-face to face time preparing to see the patient (eg, review of tests), obtaining and/or reviewing separately obtained history, documenting clinical information in the electronic or other health record, independently interpreting results and communicating results to the patient/family/caregiver, or care coordinator.     RETURN TO CLINIC IN: 1 MONTH    FOR NEXT VISIT: REVIEW LABS and MEDICATION MONITORING       Deanna Jacobs MD  [facility]  Disclaimer:  This note has been generated using voice-recognition software. There may be grammatical or spelling errors that have been missed during proof-reading           [1]   Current Outpatient Medications   Medication Sig Dispense Refill    levothyroxine (SYNTHROID) 100 MCG tablet Take 1 tablet (100 mcg total) by mouth before breakfast. 90 tablet 3    naltrexone-bupropion (CONTRAVE) 8-90 mg TbSR Take 1 tablet oral daily x 1 week, Then 1 tab oral twice a day x 1 week. Then 2 tabs in am and 1 tab in the evening, Then 2 tabs twice a day. 120 tablet 3     No current facility-administered medications for this visit.

## 2025-04-08 ENCOUNTER — RESULTS FOLLOW-UP (OUTPATIENT)
Dept: PRIMARY CARE CLINIC | Facility: CLINIC | Age: 75
End: 2025-04-08

## 2025-04-08 ENCOUNTER — PATIENT MESSAGE (OUTPATIENT)
Dept: ADMINISTRATIVE | Facility: HOSPITAL | Age: 75
End: 2025-04-08
Payer: MEDICARE

## 2025-04-08 ENCOUNTER — TELEPHONE (OUTPATIENT)
Dept: PRIMARY CARE CLINIC | Facility: CLINIC | Age: 75
End: 2025-04-08
Payer: MEDICARE

## 2025-04-08 ENCOUNTER — APPOINTMENT (OUTPATIENT)
Dept: LAB | Facility: HOSPITAL | Age: 75
End: 2025-04-08
Attending: INTERNAL MEDICINE
Payer: MEDICARE

## 2025-04-08 RX ORDER — BUPROPION HYDROCHLORIDE 150 MG/1
150 TABLET ORAL DAILY
Qty: 30 TABLET | Refills: 11 | Status: SHIPPED | OUTPATIENT
Start: 2025-04-08 | End: 2025-04-09 | Stop reason: SDUPTHER

## 2025-04-08 NOTE — ASSESSMENT & PLAN NOTE
- Prescribed Contrave (bupropion/naltrexone combination) for depression and weight management.  - If unable to get Contrave then will try Wellbutrin XL.  - Discussed potential side effects, including nervousness, irritability, and initial worsening of depression symptoms.  - Advised to report any concerns.  - Plan to monitor BP and heart rate.  - Patrick to reduce alcohol intake while on medication.

## 2025-04-08 NOTE — ASSESSMENT & PLAN NOTE
- Patrick reports history of 95% hearing loss in one ear, using hearing aids with good results from the latest one..  - Had 2 falls by tripping and feels balance did not help. No significant injuries or loss of consciousness reported. Denies dizziness.  - Considered potential causes, including hearing issues and fluid intake.  - Recommend continued use of hearing aids and maintaining adequate hydration.  - Follow labs.

## 2025-04-08 NOTE — TELEPHONE ENCOUNTER
----- Message from Destiney Villalpando RN sent at 4/8/2025 10:17 AM CDT -----    He sent message that Contrave is over $700 and he can't afford it.

## 2025-04-08 NOTE — ASSESSMENT & PLAN NOTE
- Yearly labs- done on 10/14/2024. New labs ordered.  - Colon cancer screening- declined.  - PSA- declines, no symptoms.  - ACP- HCPOA completed.    - Vaccination- up to date.

## 2025-04-08 NOTE — ASSESSMENT & PLAN NOTE
Last Calculated 10 yr ASCVD risk is 22%.   Prefers conservative management without medications.    Reevaluate labs with diet and weight changes.  Exercises regularly and plan meals.

## 2025-04-08 NOTE — ASSESSMENT & PLAN NOTE
- BMI: 30, mildly elevated blood pressure, and hyperlipidemia.  - Discussed emotional and binge eating patterns.  - Prescribed Contrave for both depression and weight management, explaining its potential effect on food cravings.  - Recommend low-carbohydrate diet, intermittent fasting worked for him before, and consistent exercise (minimum 1 mile daily).  - Suggested Edgar Chi and/or joining a dance class for exercise and social interaction.  - Ordered thyroid function test due to recent weight gain.

## 2025-04-08 NOTE — ASSESSMENT & PLAN NOTE
- Patrick reports occasional mild pain in one kidney area.  - Physical exam found no pain on palpation.  - Urine appears normal and clear.  - Ordered urinalysis to evaluate reported kidney pain and check for blood in urine.  - Recommend increased water intake and avoiding soft drinks.

## 2025-04-08 NOTE — ASSESSMENT & PLAN NOTE
Reports blood pressure tends to be higher in am after drinking coffee and improves later during the day. Notices normal blood pressure before coffee.  Monitor blood pressure and keep log for next visit.

## 2025-04-09 ENCOUNTER — PATIENT MESSAGE (OUTPATIENT)
Dept: PRIMARY CARE CLINIC | Facility: CLINIC | Age: 75
End: 2025-04-09
Payer: MEDICARE

## 2025-04-09 DIAGNOSIS — Z12.11 SCREENING FOR COLON CANCER: ICD-10-CM

## 2025-04-09 RX ORDER — BUPROPION HYDROCHLORIDE 150 MG/1
150 TABLET ORAL DAILY
Qty: 30 TABLET | Refills: 11 | Status: SHIPPED | OUTPATIENT
Start: 2025-04-09 | End: 2026-04-09

## 2025-04-25 ENCOUNTER — PATIENT MESSAGE (OUTPATIENT)
Dept: PRIMARY CARE CLINIC | Facility: CLINIC | Age: 75
End: 2025-04-25
Payer: MEDICARE

## 2025-04-28 DIAGNOSIS — Z00.00 ENCOUNTER FOR MEDICARE ANNUAL WELLNESS EXAM: ICD-10-CM

## 2025-07-07 ENCOUNTER — OFFICE VISIT (OUTPATIENT)
Dept: PRIMARY CARE CLINIC | Facility: CLINIC | Age: 75
End: 2025-07-07
Payer: MEDICARE

## 2025-07-07 VITALS
OXYGEN SATURATION: 98 % | SYSTOLIC BLOOD PRESSURE: 139 MMHG | BODY MASS INDEX: 31.14 KG/M2 | DIASTOLIC BLOOD PRESSURE: 94 MMHG | WEIGHT: 204.81 LBS | HEART RATE: 66 BPM

## 2025-07-07 DIAGNOSIS — E03.9 HYPOTHYROIDISM, UNSPECIFIED TYPE: ICD-10-CM

## 2025-07-07 DIAGNOSIS — R53.83 FATIGUE, UNSPECIFIED TYPE: Primary | ICD-10-CM

## 2025-07-07 DIAGNOSIS — F41.8 DEPRESSION WITH ANXIETY: ICD-10-CM

## 2025-07-07 DIAGNOSIS — I10 HYPERTENSION, UNSPECIFIED TYPE: ICD-10-CM

## 2025-07-07 DIAGNOSIS — E66.9 OBESITY (BMI 30-39.9): ICD-10-CM

## 2025-07-07 DIAGNOSIS — Z00.00 HEALTHCARE MAINTENANCE: ICD-10-CM

## 2025-07-07 PROCEDURE — 3075F SYST BP GE 130 - 139MM HG: CPT | Mod: CPTII,S$GLB,, | Performed by: INTERNAL MEDICINE

## 2025-07-07 PROCEDURE — 1157F ADVNC CARE PLAN IN RCRD: CPT | Mod: CPTII,S$GLB,, | Performed by: INTERNAL MEDICINE

## 2025-07-07 PROCEDURE — 1160F RVW MEDS BY RX/DR IN RCRD: CPT | Mod: CPTII,S$GLB,, | Performed by: INTERNAL MEDICINE

## 2025-07-07 PROCEDURE — 1101F PT FALLS ASSESS-DOCD LE1/YR: CPT | Mod: CPTII,S$GLB,, | Performed by: INTERNAL MEDICINE

## 2025-07-07 PROCEDURE — 99215 OFFICE O/P EST HI 40 MIN: CPT | Mod: S$GLB,,, | Performed by: INTERNAL MEDICINE

## 2025-07-07 PROCEDURE — 1159F MED LIST DOCD IN RCRD: CPT | Mod: CPTII,S$GLB,, | Performed by: INTERNAL MEDICINE

## 2025-07-07 PROCEDURE — 3044F HG A1C LEVEL LT 7.0%: CPT | Mod: CPTII,S$GLB,, | Performed by: INTERNAL MEDICINE

## 2025-07-07 PROCEDURE — 3008F BODY MASS INDEX DOCD: CPT | Mod: CPTII,S$GLB,, | Performed by: INTERNAL MEDICINE

## 2025-07-07 PROCEDURE — 3080F DIAST BP >= 90 MM HG: CPT | Mod: CPTII,S$GLB,, | Performed by: INTERNAL MEDICINE

## 2025-07-07 PROCEDURE — 3288F FALL RISK ASSESSMENT DOCD: CPT | Mod: CPTII,S$GLB,, | Performed by: INTERNAL MEDICINE

## 2025-07-07 PROCEDURE — 99999 PR PBB SHADOW E&M-EST. PATIENT-LVL III: CPT | Mod: PBBFAC,,, | Performed by: INTERNAL MEDICINE

## 2025-07-07 PROCEDURE — 1126F AMNT PAIN NOTED NONE PRSNT: CPT | Mod: CPTII,S$GLB,, | Performed by: INTERNAL MEDICINE

## 2025-07-07 RX ORDER — HYDROCHLOROTHIAZIDE 12.5 MG/1
12.5 TABLET ORAL DAILY
Qty: 30 TABLET | Refills: 11 | Status: SHIPPED | OUTPATIENT
Start: 2025-07-07 | End: 2026-07-07

## 2025-07-07 RX ORDER — ALLOPURINOL 100 MG/1
100 TABLET ORAL
COMMUNITY
Start: 2025-04-09

## 2025-07-07 RX ORDER — BLOOD PRESSURE TEST KIT-WRIST
1 KIT MISCELLANEOUS 2 TIMES DAILY
COMMUNITY

## 2025-07-07 RX ORDER — TOPIRAMATE 25 MG/1
50 TABLET, FILM COATED ORAL 2 TIMES DAILY
Qty: 60 TABLET | Refills: 5 | Status: SHIPPED | OUTPATIENT
Start: 2025-07-07 | End: 2026-07-07

## 2025-07-07 RX ORDER — TOPIRAMATE 25 MG/1
50 TABLET, FILM COATED ORAL 2 TIMES DAILY
Qty: 60 TABLET | Refills: 5 | Status: SHIPPED | OUTPATIENT
Start: 2025-07-07 | End: 2025-07-07

## 2025-07-07 NOTE — PROGRESS NOTES
Subjective:       Patient ID: Patrick Christy is a 74 y.o. male.    Chief Complaint: Fatigue, Dizziness (In the am), and Depression      HPI  Patrick Christy is a 74 y.o. male with  hypothyroidism, hyperlipidemia, depression, gout who presents today for Fatigue, Dizziness (In the am), and Depression    Patrick presents today for follow up regarding fatigue and lack of energy    FATIGUE AND SLEEP:  He reports significant fatigue and lack of energy, describing activities as requiring forceful effort. He maintains consistent sleep schedule, obtaining 8-9 hours nightly, going to bed around 8:30-9:00 PM and waking at 6:30 AM. Energy drinks paradoxically cause drowsiness. He expresses concern about current thyroid medication dose being too low and potentially contributing to low energy levels.    EXERCISE:  He maintains daily two-mile walks seven days per week, though notes increased difficulty with exercise capacity and endurance. He experiences anxiety about completing walks, which now feel longer and more challenging than previously.  Denies chest pains, palpitations, or shortness a breath.  Physically feels okay but gets bored and exercise routine seen now long to him.    DIET AND EATING HABITS:  He reports potential binge eating behavior with difficulty controlling food intake. He follows intermittent fasting with alternating fasting days, noting increased appetite on eating days. His low-carbohydrate diet consists of meats, cheeses, eggs, and avocado, consuming two meals daily until feeling full. He describes a pattern of eating salty foods followed by non-salty foods with water intake, which perpetuates continued eating.    FLUID INTAKE:  He consumes approximately 10-15 water bottles daily, including morning routine of water with albin seeds and electrolytes. He drinks three large glasses (approximately 20 ounces each) and reports considerable sweating throughout the day.    MENTAL HEALTH:  He reports chronic, cyclical  "depression since childhood. Previous antidepressant,  bupropion, trial resulted in feeling "zombie-like" without improvement in symptoms. However, was well tolerated in the past with trileptal.    SUBSTANCE USE:  He consumes small amounts of alcohol almost daily, reporting minimal effects beyond slight relaxation. He has reduced coffee intake to approximately half a cup daily from previous two cups due to blood pressure concerns.    CURRENT MEDICATIONS:  He takes magnesium twice daily, melatonin 10mg at night, LORE supplement for calming effects, and B12 supplements (self-initiated for possible anemia).    CARDIOVASCULAR:   He wants to avoid statins to treat his cholesterol.  Wants to treat lipid profile conservatively.    Monitors his blood pressure at home and has been occasionally elevated.  However, notices with exercise his blood pressure will go down to normal.    ROS:  General: -fever, -chills, +fatigue, -weight gain, -weight loss, +excessive thirst  Eyes: -vision changes, -redness, -discharge  ENT: -ear pain, -nasal congestion, -sore throat  Cardiovascular: -chest pain, -palpitations, -lower extremity edema  Respiratory: -cough, -shortness of breath  Gastrointestinal: -abdominal pain, -nausea, -vomiting, -diarrhea, -constipation, -blood in stool, +increased appetite, +food binging  Genitourinary: -dysuria, -hematuria, -frequency, +excessive urination  Musculoskeletal: -joint pain, -muscle pain  Skin: -rash, -lesion  Neurological: -headache, -dizziness, -numbness, -tingling, +excessive drowsiness  Psychiatric: +anxiety, +depression, -sleep difficulty          Review of Systems   Constitutional:  Positive for activity change and unexpected weight change.   HENT:  Negative for hearing loss, rhinorrhea and trouble swallowing.    Eyes:  Negative for discharge and visual disturbance.   Respiratory:  Negative for chest tightness and wheezing.    Cardiovascular:  Negative for chest pain and palpitations. "   Gastrointestinal:  Negative for blood in stool, constipation, diarrhea and vomiting.   Endocrine: Negative for polydipsia and polyuria.   Genitourinary:  Negative for difficulty urinating, hematuria and urgency.   Musculoskeletal:  Negative for arthralgias, joint swelling and neck pain.   Neurological:  Negative for weakness and headaches.   Psychiatric/Behavioral:  Negative for confusion and dysphoric mood.       Past Medical History:   Diagnosis Date    Benign paroxysmal vertigo, bilateral     Depression     Gout, unspecified     Hip osteoarthritis     Hypothyroidism        Past Surgical History:   Procedure Laterality Date    CATARACT EXTRACTION Bilateral     HIP REPLACEMENT ARTHROPLASTY      NASAL SEPTUM SURGERY         Family History   Problem Relation Name Age of Onset    Osteoarthritis Mother      Lupus Mother      Hyperlipidemia Father      Heart attack Father  51    No Known Problems Sister 2     Down syndrome Sister 1     Cancer Brother 1         Possibly stomach    No Known Problems Brother      Diabetes Maternal Grandmother      Cancer Maternal Grandfather          skin cancer    Sudden death Maternal Grandfather      Cancer Paternal Grandmother          In leg, resected and cured.    Blindness Paternal Grandfather      Dementia Paternal Grandfather         Social History     Socioeconomic History    Marital status:    Tobacco Use    Smoking status: Never    Smokeless tobacco: Never   Substance and Sexual Activity    Alcohol use: Not Currently     Alcohol/week: 7.0 standard drinks of alcohol     Types: 3 Glasses of wine, 4 Shots of liquor per week     Comment: once dalily    Drug use: Never     Social Drivers of Health     Financial Resource Strain: Low Risk  (4/4/2025)    Overall Financial Resource Strain (CARDIA)     Difficulty of Paying Living Expenses: Not hard at all   Food Insecurity: No Food Insecurity (4/4/2025)    Hunger Vital Sign     Worried About Running Out of Food in the Last Year:  "Never true     Ran Out of Food in the Last Year: Never true   Transportation Needs: No Transportation Needs (4/4/2025)    PRAPARE - Transportation     Lack of Transportation (Medical): No     Lack of Transportation (Non-Medical): No   Physical Activity: Sufficiently Active (4/4/2025)    Exercise Vital Sign     Days of Exercise per Week: 7 days     Minutes of Exercise per Session: 30 min   Stress: Stress Concern Present (4/4/2025)    [employer] - Occupational Stress Questionnaire     Feeling of Stress : To some extent   Housing Stability: Low Risk  (4/4/2025)    Housing Stability Vital Sign     Unable to Pay for Housing in the Last Year: No     Number of Times Moved in the Last Year: 0     Homeless in the Last Year: No       Current Medications[1]    Review of patient's allergies indicates:   Allergen Reactions    Iodine Anaphylaxis and Other (See Comments)    Iodine and iodide containing products Anaphylaxis         Objective:       Last 3 sets of Vitals        10/21/2024    11:10 AM 4/7/2025     9:12 AM 7/7/2025    11:11 AM   Vitals - 1 value per visit   SYSTOLIC 134 146 139   DIASTOLIC 88 82 94   Pulse 74 67 66   SPO2 99 % 99 % 98 %   Weight (lb) 185.19 202.38 204.81   Weight (kg) 84 91.8 92.9   Height 5' 8" (1.727 m) 5' 8" (1.727 m)    BMI (Calculated) 28.2 30.8    Pain Score Zero Zero Zero   Physical Exam  Constitutional:       General: He is not in acute distress.  HENT:      Head: Normocephalic.      Right Ear: Tympanic membrane, ear canal and external ear normal.      Left Ear: Tympanic membrane, ear canal and external ear normal.      Nose: Nose normal.      Mouth/Throat:      Mouth: Mucous membranes are moist.   Eyes:      General: No scleral icterus.     Extraocular Movements: Extraocular movements intact.      Conjunctiva/sclera: Conjunctivae normal.   Neck:      Vascular: No carotid bruit.      Comments: No goiter.  Cardiovascular:      Rate and Rhythm: Normal rate and " regular rhythm.      Pulses: Normal pulses.      Heart sounds: Normal heart sounds.   Pulmonary:      Effort: Pulmonary effort is normal.      Breath sounds: Normal breath sounds.   Abdominal:      General: Bowel sounds are normal. There is no distension.      Palpations: Abdomen is soft. There is no mass.      Tenderness: There is no abdominal tenderness.   Musculoskeletal:         General: No swelling.   Lymphadenopathy:      Cervical: No cervical adenopathy.   Skin:     General: Skin is warm and dry.   Neurological:      General: No focal deficit present.      Mental Status: He is alert and oriented to person, place, and time.   Psychiatric:         Mood and Affect: Mood normal.         Behavior: Behavior normal.           CBC:  Recent Labs   Lab 03/18/24 0816 04/08/25  0842   WBC 4.95 4.93   RBC 4.90 4.74   Hemoglobin 15.4  --    HGB  --  14.8   Hematocrit 45.4  --    HCT  --  43.9   Platelet Count  --  291   Platelets 295  --    MCV 93 93   MCH 31.4 H 31.2 H   MCHC 33.9 33.7     CMP:  Recent Labs   Lab 03/18/24  0816 10/14/24  0817 04/08/25  0842   Glucose 87 94 94   Calcium 9.7 9.6 9.8   Albumin 4.1 3.8 4.0   Protein Total  --   --  8.2   Total Protein 7.8 7.4  --    Sodium 143 141 139   Potassium 4.8 5.1 4.4   CO2 23 25 22 L   Chloride 109 107 105   BUN 21 21 17   Creatinine 0.9 0.9 0.9   Alkaline Phosphatase 70 73  --    ALP  --   --  105   ALT 13 11 18   AST 21 17 24   Total Bilirubin 0.5 0.4  --    Bilirubin Total  --   --  0.8     URINALYSIS:  Recent Labs   Lab 04/08/25  0842   Color, UA Yellow   Spec Grav UA 1.015   pH, UA 7.0   Protein, UA Negative   Nitrites, UA Negative   Leukocyte Esterase, UA Negative   Urobilinogen, UA Negative      LIPIDS:  Recent Labs   Lab 03/18/24  0816 10/14/24  0817 04/08/25  0842   TSH 1.772 0.736 0.503   HDL 44 50  --    HDL Cholesterol  --   --  42   Cholesterol Total  --   --  200 H   Cholesterol 213 H 177  --    Triglycerides 48 41  --    Triglyceride  --   --  72   LDL  Cholesterol 159.4 H 118.8 143.6   HDL/Cholesterol Ratio 20.7 28.2 21.0   Non-HDL Cholesterol 169 127  --    Non HDL Cholesterol  --   --  158   Total Cholesterol/HDL Ratio 4.8 3.5  --    Cholesterol/HDL Ratio  --   --  4.8     TSH:  Recent Labs   Lab 03/18/24  0816 10/14/24  0817 04/08/25  0842   TSH 1.772 0.736 0.503       A1C:  Recent Labs   Lab 03/18/24  0816 10/14/24  0817 04/08/25  0842   Hemoglobin A1C 5.2 4.9  --    Hemoglobin A1c  --   --  5.3       Imaging:  X-Ray Chest PA And Lateral  Narrative: EXAMINATION:  XR CHEST PA AND LATERAL    CLINICAL HISTORY:  Acute bronchitis, unspecified    TECHNIQUE:  PA and lateral views of the chest were performed.    COMPARISON:  Chest radiograph 07/07/2018    FINDINGS:  The lungs are clear, with normal appearance of pulmonary vasculature and no pleural effusion or pneumothorax.    The cardiac silhouette is unchanged in size. The hilar and mediastinal contours are unremarkable.    Bones are intact.  Degenerative change.  Impression: No acute intrathoracic abnormality.    Electronically signed by: Carlos Wade  Date:    04/18/2024  Time:    12:58      Assessment:       1. Fatigue, unspecified type    2. Hypothyroidism, unspecified type    3. Hypertension, unspecified type    4. Obesity (BMI 30-39.9)    5. Moderate major depression    6. Healthcare maintenance            Plan:       1. Fatigue, unspecified type  Assessment & Plan:  - Patrick reports feeling fatigued and lacking energy despite adequate sleep.  - Ordered testosterone level check to assess potential contribution to fatigue, to be done fasting before 8 AM.  - Discussed evaluating for sleep apnea and considering vitamin B12 supplementation for potential anemia-related fatigue.    Orders:  -     Testosterone,Total; Future; Expected date: 07/07/2025  -     TSH; Future; Expected date: 07/07/2025  -     T4, Free; Future; Expected date: 07/07/2025    2. Hypothyroidism, unspecified type  Overview:  On levothroxine 100 mcg  daily.    Assessment & Plan:  - Ordered thyroid function test due to concern about low dosage, which may contribute to the reported fatigue and lack of energy.  - Patrick's thyroid function was last checked in April, and the dose appear stable.  - Discussed rechecking thyroid function and potentially adjusting thyroid medication dosage based on new test results.    Orders:  -     TSH; Future; Expected date: 07/07/2025  -     T4, Free; Future; Expected date: 07/07/2025    3. Hypertension, unspecified type  Assessment & Plan:  - Prescribed hydrochlorothiazide (low-dose diuretic) for blood pressure control.  - Measured blood pressure at 154/90 during the visit,   and has had other visits with mild hypertension.  - Patrick monitors blood pressure at home, noting fluctuations with exercise and throughout the day.  - Instructed the patient to send home blood pressure readings  Via portal.    Orders:  -     hydroCHLOROthiazide 12.5 MG Tab; Take 1 tablet (12.5 mg total) by mouth once daily.  Dispense: 30 tablet; Refill: 11    4. Obesity (BMI 30-39.9)  -     topiramate (TOPAMAX) 25 MG tablet; Take 2 tablets (50 mg total) by mouth 2 (two) times daily. Start 25mg Nightly x 1 week then 2 tablets nightly.  Dispense: 60 tablet; Refill: 5    5. Moderate major depression  Assessment & Plan:  - Patrick could not afford Contrave.   Wellbutrin XL was ordered but describes feeling like a zombie and experiencing depression symptoms including fatigue and lack of energy probably not taking the medication enough time.  - Discussed treatment options for depression including  trying again the Wellbutrin with the Trileptal.  - Topiramate  may help with binge eating and not sure will have effect on mood but will talk to our Behavioral team.  - Patrick reports feeling anxious about completing exercises and experiencing mood-related boredom.  - Discussed potential interaction with alcohol and topiramate, including increased risk of dizziness and  confusion.  - Advised the patient to reduce alcohol intake, especially on days taking topiramate, to avoid adverse interactions.        6. Healthcare maintenance    Other orders  -     Discontinue: topiramate (TOPAMAX) 25 MG tablet; Take 2 tablets (50 mg total) by mouth 2 (two) times daily.  Dispense: 60 tablet; Refill: 5         EXERCISE AND LIFESTYLE RECOMMENDATIONS:  - Patrick to continue current exercise regimen of walking 2 miles daily.    FOLLOW-UP:  - Follow up in 1 month to assess response to new medications and review test results.        Health Maintenance Due   Topic Date Due    Hepatitis C Screening  Never done        I spent a total of 40 minutes on the day of the visit.This includes face to face time and non-face to face time preparing to see the patient (eg, review of tests), obtaining and/or reviewing separately obtained history, documenting clinical information in the electronic or other health record, independently interpreting results and communicating results to the patient/family/caregiver, or care coordinator.     RETURN TO CLINIC IN: 1 MONTH    FOR NEXT VISIT: REVIEW LABS and MEDICATION MONITORING       Deanna Jacobs MD  [facility]  Disclaimer:  This note has been generated using voice-recognition software. There may be grammatical or spelling errors that have been missed during proof-reading      This note was generated with the assistance of ambient listening technology. Verbal consent was obtained by the patient and accompanying visitor(s) for the recording of patient appointment to facilitate this note. I attest to having reviewed and edited the generated note for accuracy, though some syntax or spelling errors may persist. Please contact the author of this note for any clarification.            [1]   Current Outpatient Medications   Medication Sig Dispense Refill    allopurinoL (ZYLOPRIM) 100 MG tablet Take 100 mg by mouth. Only when needed      levothyroxine (SYNTHROID) 100 MCG  tablet Take 1 tablet (100 mcg total) by mouth before breakfast. 90 tablet 3    magnesium glycinate 100 mg magnesium Cap Take 1 capsule by mouth 2 (two) times a day.      hydroCHLOROthiazide 12.5 MG Tab Take 1 tablet (12.5 mg total) by mouth once daily. 30 tablet 11    topiramate (TOPAMAX) 25 MG tablet Take 2 tablets (50 mg total) by mouth 2 (two) times daily. Start 25mg Nightly x 1 week then 2 tablets nightly. 60 tablet 5     No current facility-administered medications for this visit.

## 2025-07-07 NOTE — Clinical Note
Guru, Mr. Christy reports history of chronic depression.  Usually does not like to take medications.  He usually enjoys exercising and is very proactive with his weight control.  In the last few months gained some weight with depression and stressors.  In the past Wellbutrin with Trileptal help him but this time I tried Wellbutrin when  he could not afford Contrave  to help weight loss.  With Wellbutrin alone felt like a zombie and does not once a day the medications.  I am trying Topamax for weight loss. Do you have any recommendations regarding depression medications that may not  making slow and not gained weight? However, does need help with the motivation to enjoy the workouts he was saw involved with.  Thank you, Deanna

## 2025-07-08 ENCOUNTER — LAB VISIT (OUTPATIENT)
Dept: LAB | Facility: HOSPITAL | Age: 75
End: 2025-07-08
Attending: INTERNAL MEDICINE
Payer: MEDICARE

## 2025-07-08 ENCOUNTER — RESULTS FOLLOW-UP (OUTPATIENT)
Dept: PRIMARY CARE CLINIC | Facility: CLINIC | Age: 75
End: 2025-07-08
Payer: MEDICARE

## 2025-07-08 DIAGNOSIS — E03.9 HYPOTHYROIDISM, UNSPECIFIED TYPE: ICD-10-CM

## 2025-07-08 DIAGNOSIS — R53.83 FATIGUE, UNSPECIFIED TYPE: ICD-10-CM

## 2025-07-08 PROBLEM — F41.8 DEPRESSION WITH ANXIETY: Status: ACTIVE | Noted: 2024-03-15

## 2025-07-08 LAB
T4 FREE SERPL-MCNC: 1.09 NG/DL (ref 0.71–1.51)
T4 FREE SERPL-MCNC: 1.09 NG/DL (ref 0.71–1.51)
TESTOST SERPL-MCNC: 465 NG/DL (ref 304–1227)
TSH SERPL-ACNC: 1.08 UIU/ML (ref 0.4–4)

## 2025-07-08 PROCEDURE — 84439 ASSAY OF FREE THYROXINE: CPT

## 2025-07-08 PROCEDURE — 84403 ASSAY OF TOTAL TESTOSTERONE: CPT

## 2025-07-08 PROCEDURE — 36415 COLL VENOUS BLD VENIPUNCTURE: CPT

## 2025-07-08 NOTE — ASSESSMENT & PLAN NOTE
- Prescribed hydrochlorothiazide (low-dose diuretic) for blood pressure control.  - Measured blood pressure at 154/90 during the visit,   and has had other visits with mild hypertension.  - Patrick monitors blood pressure at home, noting fluctuations with exercise and throughout the day.  - Instructed the patient to send home blood pressure readings  Via portal.

## 2025-07-08 NOTE — ASSESSMENT & PLAN NOTE
- Patrick could not afford Contrave.   Wellbutrin XL was ordered but describes feeling like a zombie and experiencing depression symptoms including fatigue and lack of energy probably not taking the medication enough time.  - Discussed treatment options for depression including  trying again the Wellbutrin with the Trileptal.  - Topiramate  may help with binge eating and not sure will have effect on mood but will talk to our Behavioral team.  - Patrick reports feeling anxious about completing exercises and experiencing mood-related boredom.  - Discussed potential interaction with alcohol and topiramate, including increased risk of dizziness and confusion.  - Advised the patient to reduce alcohol intake, especially on days taking topiramate, to avoid adverse interactions.

## 2025-07-08 NOTE — ASSESSMENT & PLAN NOTE
- Patrick reports feeling fatigued and lacking energy despite adequate sleep.  - Ordered testosterone level check to assess potential contribution to fatigue, to be done fasting before 8 AM.  - Discussed evaluating for sleep apnea and considering vitamin B12 supplementation for potential anemia-related fatigue.

## 2025-07-08 NOTE — ASSESSMENT & PLAN NOTE
- Ordered thyroid function test due to concern about low dosage, which may contribute to the reported fatigue and lack of energy.  - Patrick's thyroid function was last checked in April, and the dose appear stable.  - Discussed rechecking thyroid function and potentially adjusting thyroid medication dosage based on new test results.

## 2025-07-09 ENCOUNTER — TELEPHONE (OUTPATIENT)
Dept: PRIMARY CARE CLINIC | Facility: CLINIC | Age: 75
End: 2025-07-09
Payer: MEDICARE

## 2025-07-09 NOTE — TELEPHONE ENCOUNTER
Copied from CRM #[id number]. Topic: Medications - Pharmacy  >> Jul 9, 2025 11:43 AM Siobhan wrote:  Type:  Pharmacy Calling to Clarify an RX    Name of Caller: Pt called due to the pharmacy needing clarification on the pt's new medication for Topamax 25 mg. Pt also states that the pharmacy says that they can issue a 90 day supply instead of a 15 day supply.  Pharmacy Name: [facility] - [address]  Prescription Name: Topamax 25 mg.  What do they need to clarify?: Pt's states that the Rx was written 4 times and pt would like a 90 day Supply  Best Call Back Number: [phone] Doctor's Phone Line    Pt can be reached at  [phone]

## 2025-07-14 ENCOUNTER — TELEPHONE (OUTPATIENT)
Dept: PRIMARY CARE CLINIC | Facility: CLINIC | Age: 75
End: 2025-07-14
Payer: MEDICARE

## 2025-07-14 NOTE — TELEPHONE ENCOUNTER
----- Message from Fredrick Cárdneas PA-C sent at 7/11/2025  9:49 AM CDT -----  Good morning, yes neither Auvelity nor Trintellix are contraindicated with topamax.  ----- Message -----  From: Deanna Jacobs MD  Sent: 7/10/2025   6:25 PM CDT  To: Fredrick Patterson PA-C    Can this medications be used with Topamax. Trying the medication for weight loss?  ----- Message -----  From: Cherie Foy DMSc, PA-C  Sent: 7/9/2025   3:43 PM CDT  To: MD Guru Saravia, I know I recommend Trintellix a lot, but this might be a good case for it as well.  Otherwise you could try Auvelity (dextromethorphan-bupropion).  The bupropion is a low dose and only there to inhibit dextromethorphan to prolong it's action.  It works within the first week, although it will likely need a prior authorization.  It's given QD for 1 week, then BID.  It works on glutamate, not serotonin, which can help with motivation and energy.  ----- Message -----  From: Deanna Jacobs MD  Sent: 7/8/2025  12:30 AM CDT  To: Fredrick Patterson PA-C Hello, Mr. Christy reports history of chronic depression.  Usually does not like to take medications.  He usually enjoys exercising and is very proactive with his weight control.  In the last few months gained some weight with depression and stressors.  In the past Wellbutrin with Trileptal help him but this time I tried Wellbutrin when  he could not afford Contrave  to help weight loss.  With Wellbutrin alone felt like a zombie and does not once a day the medications.  I am trying Topamax for weight loss. Do you have any recommendations regarding depression medications that may not  making slow and not gained weight? However, does need help with the motivation to enjoy the workouts he was saw involved with.  Thank you, Deanna

## 2025-07-22 RX ORDER — MELOXICAM 15 MG/1
15 TABLET ORAL DAILY
COMMUNITY
Start: 2025-07-16 | End: 2025-11-13

## 2025-07-22 RX ORDER — CLINDAMYCIN HYDROCHLORIDE 300 MG/1
300 CAPSULE ORAL 2 TIMES DAILY
COMMUNITY
Start: 2025-07-16

## 2025-08-11 ENCOUNTER — PATIENT MESSAGE (OUTPATIENT)
Dept: PRIMARY CARE CLINIC | Facility: CLINIC | Age: 75
End: 2025-08-11

## 2025-08-11 ENCOUNTER — OFFICE VISIT (OUTPATIENT)
Dept: PRIMARY CARE CLINIC | Facility: CLINIC | Age: 75
End: 2025-08-11
Payer: MEDICARE

## 2025-08-11 VITALS
BODY MASS INDEX: 30.1 KG/M2 | HEIGHT: 68 IN | HEART RATE: 67 BPM | WEIGHT: 198.63 LBS | SYSTOLIC BLOOD PRESSURE: 136 MMHG | DIASTOLIC BLOOD PRESSURE: 99 MMHG

## 2025-08-11 DIAGNOSIS — F41.8 DEPRESSION WITH ANXIETY: ICD-10-CM

## 2025-08-11 DIAGNOSIS — E66.811 OBESITY, CLASS I, BMI 30-34.9: ICD-10-CM

## 2025-08-11 DIAGNOSIS — M54.9 BACK PAIN WITHOUT SCIATICA: Primary | ICD-10-CM

## 2025-08-11 DIAGNOSIS — E03.9 HYPOTHYROIDISM, UNSPECIFIED TYPE: ICD-10-CM

## 2025-08-11 DIAGNOSIS — Z00.00 HEALTHCARE MAINTENANCE: ICD-10-CM

## 2025-08-11 DIAGNOSIS — R03.0 WHITE COAT SYNDROME WITHOUT DIAGNOSIS OF HYPERTENSION: ICD-10-CM

## 2025-08-11 LAB
BILIRUB UR QL STRIP.AUTO: NEGATIVE
CLARITY UR: CLEAR
COLOR UR AUTO: COLORLESS
GLUCOSE UR QL STRIP: NEGATIVE
HGB UR QL STRIP: NEGATIVE
KETONES UR QL STRIP: NEGATIVE
LEUKOCYTE ESTERASE UR QL STRIP: NEGATIVE
NITRITE UR QL STRIP: NEGATIVE
PH UR STRIP: 8 [PH]
PROT UR QL STRIP: NEGATIVE
SP GR UR STRIP: 1.01
UROBILINOGEN UR STRIP-ACNC: NEGATIVE EU/DL

## 2025-08-11 PROCEDURE — 99999 PR PBB SHADOW E&M-EST. PATIENT-LVL III: CPT | Mod: PBBFAC,,, | Performed by: INTERNAL MEDICINE

## 2025-08-11 PROCEDURE — 99214 OFFICE O/P EST MOD 30 MIN: CPT | Mod: S$GLB,,, | Performed by: INTERNAL MEDICINE

## 2025-08-11 PROCEDURE — 3080F DIAST BP >= 90 MM HG: CPT | Mod: CPTII,S$GLB,, | Performed by: INTERNAL MEDICINE

## 2025-08-11 PROCEDURE — 1157F ADVNC CARE PLAN IN RCRD: CPT | Mod: CPTII,S$GLB,, | Performed by: INTERNAL MEDICINE

## 2025-08-11 PROCEDURE — 1170F FXNL STATUS ASSESSED: CPT | Mod: CPTII,S$GLB,, | Performed by: INTERNAL MEDICINE

## 2025-08-11 PROCEDURE — 1159F MED LIST DOCD IN RCRD: CPT | Mod: CPTII,S$GLB,, | Performed by: INTERNAL MEDICINE

## 2025-08-11 PROCEDURE — 3008F BODY MASS INDEX DOCD: CPT | Mod: CPTII,S$GLB,, | Performed by: INTERNAL MEDICINE

## 2025-08-11 PROCEDURE — 3044F HG A1C LEVEL LT 7.0%: CPT | Mod: CPTII,S$GLB,, | Performed by: INTERNAL MEDICINE

## 2025-08-11 PROCEDURE — 1126F AMNT PAIN NOTED NONE PRSNT: CPT | Mod: CPTII,S$GLB,, | Performed by: INTERNAL MEDICINE

## 2025-08-11 PROCEDURE — 1101F PT FALLS ASSESS-DOCD LE1/YR: CPT | Mod: CPTII,S$GLB,, | Performed by: INTERNAL MEDICINE

## 2025-08-11 PROCEDURE — 3288F FALL RISK ASSESSMENT DOCD: CPT | Mod: CPTII,S$GLB,, | Performed by: INTERNAL MEDICINE

## 2025-08-11 PROCEDURE — 1160F RVW MEDS BY RX/DR IN RCRD: CPT | Mod: CPTII,S$GLB,, | Performed by: INTERNAL MEDICINE

## 2025-08-11 PROCEDURE — 81003 URINALYSIS AUTO W/O SCOPE: CPT | Performed by: INTERNAL MEDICINE

## 2025-08-11 PROCEDURE — 3075F SYST BP GE 130 - 139MM HG: CPT | Mod: CPTII,S$GLB,, | Performed by: INTERNAL MEDICINE
